# Patient Record
Sex: FEMALE | Race: BLACK OR AFRICAN AMERICAN | NOT HISPANIC OR LATINO | URBAN - METROPOLITAN AREA
[De-identification: names, ages, dates, MRNs, and addresses within clinical notes are randomized per-mention and may not be internally consistent; named-entity substitution may affect disease eponyms.]

---

## 2020-06-04 ENCOUNTER — OFFICE VISIT (OUTPATIENT)
Dept: URBAN - METROPOLITAN AREA CLINIC 113 | Facility: CLINIC | Age: 30
End: 2020-06-04

## 2020-06-23 ENCOUNTER — OFFICE VISIT (OUTPATIENT)
Dept: URBAN - METROPOLITAN AREA CLINIC 113 | Facility: CLINIC | Age: 30
End: 2020-06-23

## 2020-06-29 ENCOUNTER — OFFICE VISIT (OUTPATIENT)
Dept: URBAN - METROPOLITAN AREA CLINIC 13 | Facility: CLINIC | Age: 30
End: 2020-06-29

## 2020-07-23 ENCOUNTER — OFFICE VISIT (OUTPATIENT)
Dept: URBAN - METROPOLITAN AREA CLINIC 113 | Facility: CLINIC | Age: 30
End: 2020-07-23

## 2020-07-25 ENCOUNTER — TELEPHONE ENCOUNTER (OUTPATIENT)
Dept: URBAN - METROPOLITAN AREA CLINIC 13 | Facility: CLINIC | Age: 30
End: 2020-07-25

## 2020-07-25 RX ORDER — SODIUM PICOSULFATE, MAGNESIUM OXIDE, AND ANHYDROUS CITRIC ACID 10; 3.5; 12 MG/160ML; G/160ML; G/160ML
5PM DAY BEFORE PROCEDURE DRINK FIRST BOTTLE, 6 HOURS BEFORE PROCEDURE DRINK THE SECOND BOTTLE LIQUID ORAL
Qty: 1 | Refills: 0 | OUTPATIENT
Start: 2019-12-03 | End: 2020-01-14

## 2020-07-25 RX ORDER — CHROMIUM 200 MCG
TAKE 2 TABLET DAILY TABLET ORAL
Refills: 0 | OUTPATIENT
End: 2020-06-23

## 2020-07-25 RX ORDER — PREDNISONE 10 MG/1
TAKE 4 TABLETS DAILY TABLET ORAL
Qty: 120 | Refills: 1 | OUTPATIENT
Start: 2019-11-08 | End: 2019-11-27

## 2020-07-25 RX ORDER — USTEKINUMAB 90 MG/ML
EVERY TWO MONTHS INJECTION, SOLUTION SUBCUTANEOUS
Refills: 0 | OUTPATIENT
End: 2020-04-30

## 2020-07-25 RX ORDER — ONDANSETRON HYDROCHLORIDE 4 MG/1
EVERY 4-6 HOURS AS NEEDED FOR NAUSEA TABLET, FILM COATED ORAL
Qty: 30 | Refills: 1 | OUTPATIENT
Start: 2019-10-09 | End: 2019-11-27

## 2020-07-25 RX ORDER — PANTOPRAZOLE SODIUM 40 MG/1
TAKE 1 TABLET BY MOUTH EVERY DAY TABLET, DELAYED RELEASE ORAL
Qty: 30 | Refills: 6 | OUTPATIENT
Start: 2020-06-23 | End: 2020-07-16

## 2020-07-26 ENCOUNTER — TELEPHONE ENCOUNTER (OUTPATIENT)
Dept: URBAN - METROPOLITAN AREA CLINIC 13 | Facility: CLINIC | Age: 30
End: 2020-07-26

## 2020-07-26 RX ORDER — FLUCONAZOLE 150 MG/1
TABLET ORAL
Qty: 3 | Refills: 0 | Status: ACTIVE | COMMUNITY
Start: 2019-12-07

## 2020-07-26 RX ORDER — PANTOPRAZOLE SODIUM 40 MG/1
TAKE 1 TABLET BY MOUTH EVERY DAY TABLET, DELAYED RELEASE ORAL
Qty: 90 | Refills: 3 | Status: ACTIVE | COMMUNITY
Start: 2020-07-16

## 2020-07-26 RX ORDER — ONDANSETRON 8 MG/1
TAKE 1 TABLET EVERY 6-8 HOURS PRN TABLET, ORALLY DISINTEGRATING ORAL
Qty: 30 | Refills: 1 | Status: ACTIVE | COMMUNITY
Start: 2020-06-23

## 2020-07-26 RX ORDER — ERGOCALCIFEROL 1.25 MG/1
TAKE 1 CAPSULE BY MOUTH WEEKLY FOR 8 WEEKS CAPSULE ORAL
Qty: 8 | Refills: 0 | Status: ACTIVE | COMMUNITY
Start: 2020-02-23

## 2020-07-26 RX ORDER — FLUCONAZOLE 150 MG/1
TAKE 1 TABLET BY MOUTH REPEAT AFTER COMLETING ABX TABLET ORAL
Qty: 2 | Refills: 0 | Status: ACTIVE | COMMUNITY
Start: 2019-07-25

## 2020-07-26 RX ORDER — PREDNISONE 20 MG/1
TAKE 2 TABLET(S) EVERY DAY BY ORAL ROUTE FOR 7 DAYS TABLET ORAL
Qty: 14 | Refills: 0 | Status: ACTIVE | COMMUNITY
Start: 2018-09-26

## 2020-07-26 RX ORDER — PREDNISONE 10 MG/1
TAKE 3 TABLETS BY MOUTH EVERY DAY X3 DAYS-THEN 2 TABS DAILY X3DAYS-1 T TABLET ORAL
Qty: 18 | Refills: 0 | Status: ACTIVE | COMMUNITY
Start: 2018-10-03

## 2020-07-26 RX ORDER — USTEKINUMAB 90 MG/ML
INJECT 90MG EVERY FOUR WEEKS INJECTION, SOLUTION SUBCUTANEOUS
Qty: 1 | Refills: 11 | Status: ACTIVE | COMMUNITY
Start: 2020-01-22

## 2020-07-26 RX ORDER — CLINDAMYCIN HYDROCHLORIDE 300 MG/1
CAPSULE ORAL
Qty: 40 | Refills: 0 | Status: ACTIVE | COMMUNITY
Start: 2019-12-07

## 2020-09-12 ENCOUNTER — ERX REFILL RESPONSE (OUTPATIENT)
Age: 30
End: 2020-09-12

## 2020-09-12 RX ORDER — ERGOCALCIFEROL 1.25 MG/1
TAKE 1 CAPSULE BY MOUTH WEEKLY FOR 8 WEEKS CAPSULE, LIQUID FILLED ORAL
Qty: 8 | Refills: 0

## 2020-10-12 ENCOUNTER — TELEPHONE ENCOUNTER (OUTPATIENT)
Dept: URBAN - METROPOLITAN AREA CLINIC 113 | Facility: CLINIC | Age: 30
End: 2020-10-12

## 2020-10-12 RX ORDER — USTEKINUMAB 90 MG/ML
INJECT 90MG EVERY FOUR WEEKS INJECTION, SOLUTION SUBCUTANEOUS
Qty: 3 SYRINGE | Refills: 3

## 2020-11-07 ENCOUNTER — ERX REFILL RESPONSE (OUTPATIENT)
Age: 30
End: 2020-11-07

## 2020-11-07 RX ORDER — ERGOCALCIFEROL 1.25 MG/1
TAKE 1 CAPSULE BY MOUTH WEEKLY FOR 8 WEEKS CAPSULE, LIQUID FILLED ORAL
Qty: 8 | Refills: 0

## 2020-11-11 ENCOUNTER — ERX REFILL RESPONSE (OUTPATIENT)
Age: 30
End: 2020-11-11

## 2020-11-11 RX ORDER — ERGOCALCIFEROL 1.25 MG/1
TAKE 1 CAPSULE BY MOUTH WEEKLY FOR 8 WEEKS CAPSULE, LIQUID FILLED ORAL
Qty: 8 | Refills: 0

## 2021-01-13 ENCOUNTER — TELEPHONE ENCOUNTER (OUTPATIENT)
Dept: URBAN - METROPOLITAN AREA CLINIC 113 | Facility: CLINIC | Age: 31
End: 2021-01-13

## 2021-02-02 ENCOUNTER — OFFICE VISIT (OUTPATIENT)
Dept: URBAN - METROPOLITAN AREA CLINIC 113 | Facility: CLINIC | Age: 31
End: 2021-02-02

## 2021-03-10 ENCOUNTER — TELEPHONE ENCOUNTER (OUTPATIENT)
Dept: URBAN - METROPOLITAN AREA SURGERY CENTER 30 | Facility: SURGERY CENTER | Age: 31
End: 2021-03-10

## 2021-03-16 ENCOUNTER — TELEPHONE ENCOUNTER (OUTPATIENT)
Dept: URBAN - METROPOLITAN AREA CLINIC 113 | Facility: CLINIC | Age: 31
End: 2021-03-16

## 2021-04-27 ENCOUNTER — OFFICE VISIT (OUTPATIENT)
Dept: URBAN - METROPOLITAN AREA CLINIC 113 | Facility: CLINIC | Age: 31
End: 2021-04-27

## 2021-10-12 ENCOUNTER — OFFICE VISIT (OUTPATIENT)
Dept: URBAN - METROPOLITAN AREA CLINIC 107 | Facility: CLINIC | Age: 31
End: 2021-10-12
Payer: COMMERCIAL

## 2021-10-12 ENCOUNTER — WEB ENCOUNTER (OUTPATIENT)
Dept: URBAN - METROPOLITAN AREA CLINIC 107 | Facility: CLINIC | Age: 31
End: 2021-10-12

## 2021-10-12 VITALS
DIASTOLIC BLOOD PRESSURE: 90 MMHG | BODY MASS INDEX: 30.37 KG/M2 | SYSTOLIC BLOOD PRESSURE: 123 MMHG | HEIGHT: 66 IN | TEMPERATURE: 98.6 F | WEIGHT: 189 LBS | HEART RATE: 87 BPM

## 2021-10-12 DIAGNOSIS — K50.819 CROHN'S DISEASE OF SMALL AND LARGE INTESTINES WITH COMPLICATION: ICD-10-CM

## 2021-10-12 PROCEDURE — 99214 OFFICE O/P EST MOD 30 MIN: CPT | Performed by: INTERNAL MEDICINE

## 2021-10-12 RX ORDER — PREDNISONE 10 MG/1
TAKE 3 TABLETS BY MOUTH EVERY DAY X3 DAYS-THEN 2 TABS DAILY X3DAYS-1 T TABLET ORAL
Qty: 18 | Refills: 0 | Status: ON HOLD | COMMUNITY
Start: 2018-10-03

## 2021-10-12 RX ORDER — PREDNISONE 20 MG/1
TAKE 2 TABLET(S) EVERY DAY BY ORAL ROUTE FOR 7 DAYS TABLET ORAL
Qty: 14 | Refills: 0 | Status: ON HOLD | COMMUNITY
Start: 2018-09-26

## 2021-10-12 RX ORDER — USTEKINUMAB 90 MG/ML
INJECT 90MG EVERY FOUR WEEKS INJECTION, SOLUTION SUBCUTANEOUS
Qty: 3 SYRINGE | Refills: 3 | Status: ON HOLD | COMMUNITY

## 2021-10-12 RX ORDER — PANTOPRAZOLE SODIUM 40 MG/1
TAKE 1 TABLET BY MOUTH EVERY DAY TABLET, DELAYED RELEASE ORAL
Qty: 90 | Refills: 3 | Status: ON HOLD | COMMUNITY
Start: 2020-07-16

## 2021-10-12 RX ORDER — CLINDAMYCIN HYDROCHLORIDE 300 MG/1
CAPSULE ORAL
Qty: 40 | Refills: 0 | Status: ON HOLD | COMMUNITY
Start: 2019-12-07

## 2021-10-12 RX ORDER — FLUCONAZOLE 150 MG/1
TAKE 1 TABLET BY MOUTH REPEAT AFTER COMLETING ABX TABLET ORAL
Qty: 2 | Refills: 0 | Status: ON HOLD | COMMUNITY
Start: 2019-07-25

## 2021-10-12 RX ORDER — ONDANSETRON 8 MG/1
TAKE 1 TABLET EVERY 6-8 HOURS PRN TABLET, ORALLY DISINTEGRATING ORAL
Qty: 30 | Refills: 1 | Status: ON HOLD | COMMUNITY
Start: 2020-06-23

## 2021-10-12 RX ORDER — ERGOCALCIFEROL 1.25 MG/1
TAKE 1 CAPSULE BY MOUTH WEEKLY FOR 8 WEEKS CAPSULE, LIQUID FILLED ORAL
Qty: 8 | Refills: 0 | Status: ON HOLD | COMMUNITY

## 2021-10-12 NOTE — HPI-TODAY'S VISIT:
Ms. Elizabeth is a 31-year-old woman with a past medical history significant for ileocolonic Crohn's disease status post ileocolonic resection status post presenting for follow-up after an extended absence.  She was last seen in the office on 6/23/2020 for follow-up regarding Crohn's disease status post ileocecal resection on Stelara 90 mg every 5 weeks. Her previous evaluation included ulceration in the distal rectum with stricture. She underwent a loop ileostomy by Dr. Pantoja in July 2020.   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  She has discontinued her use of Stelara. She reports her ostomy output has been normal without any blood or mucus. She occasionally has some stool and mucus per rectum along with flatus. She has had no issues of perianal fistulas or perianal pain. She is interested in loop ileostomy reversal. Her last colonoscopy on 12/5/19 was notable for distal rectal ulcerated stricture (no dysplasia reported), colitis, and a patent ileocolonic anastomosis c/w active Crohn's colitis. She has not clinically benefitted from increased Stelara dosing. Her previous treatment with anti-TNFa was ineffective and immunomodulators were not tolerated (treated elsewhere).

## 2021-10-12 NOTE — HPI-OTHER HISTORIES
MRI of the brain that was unremarkable.   EGD 12/5/19: Z line irregular with distal esophageal hyperemia consistent with GERD. Mild gastric erythema, biopsied. Normal duodenum. Gastric antral biopsies revealed mild chronic gastritis with antral and oxyntic mucosa negative for H. pylori. Colonoscopy 12/5/19: Adequate bowel preparation. Perianal skin tags on perianal exam. Stricture in the distal rectum status post dilation with advancement of the scope, biopsied. Multiple ulcers in the rectum, in the sigmoid colon, in the descending colon, in the transverse colon and at the colonic anastomosis, biopsied. Patent end-to-side ileocolonic anastomosis, characterized by healthy-appearing mucosa. Unable to intubate neoterminal ileum. Rectal stricture biopsy revealed chronic active colitis, squamous mucosa with acute and chronic inflammation. Random colon biopsies revealed chronic active colitis. Colonoscopy (8/9/18, Humira every 2 weeks): Few benign ulcers in the distal ileum status post biopsy, if you ulcers at the colon anastomosis in the transverse colon, moderate Crohn's colitis of the sigmoid and rectum. Terminal ileal biopsies show mildly active chronic ileitis, ileocolonic anastomosis shows severely active chronic enteritis, biopsies of the sigmoid colon and rectum showed severely active chronic colitis. None of the specimens had any changes of dysplasia or granulomas. Upper GI series with small bowel follow through (8/22/17): rapid small bowel transit time, status post right hemicolectomy

## 2021-10-16 LAB
A/G RATIO: 1.6
ALBUMIN: 4.6
ALKALINE PHOSPHATASE: 68
ALT (SGPT): 14
AST (SGOT): 16
BASO (ABSOLUTE): 0
BASOS: 0
BILIRUBIN, TOTAL: <0.2
BUN/CREATININE RATIO: 22
BUN: 15
C-REACTIVE PROTEIN, QUANT: <1
CALCIUM: 9.9
CARBON DIOXIDE, TOTAL: 26
CHLORIDE: 103
CREATININE: 0.68
EGFR IF AFRICN AM: 137
EGFR IF NONAFRICN AM: 119
EOS (ABSOLUTE): 0
EOS: 0
GLOBULIN, TOTAL: 2.9
GLUCOSE: 93
HEMATOCRIT: 39.9
HEMATOLOGY COMMENTS:: (no result)
HEMOGLOBIN: 13.2
HEP B SURFACE AB, QUAL: NON REACTIVE
HEPATITIS B SURF AB QUANT: 5
IMMATURE CELLS: (no result)
IMMATURE GRANS (ABS): 0
IMMATURE GRANULOCYTES: 0
LYMPHS (ABSOLUTE): 1.4
LYMPHS: 16
MCH: 32.3
MCHC: 33.1
MCV: 98
MONOCYTES(ABSOLUTE): 0.6
MONOCYTES: 7
NEUTROPHILS (ABSOLUTE): 6.7
NEUTROPHILS: 77
NRBC: (no result)
PLATELETS: 212
POTASSIUM: 4.1
PROTEIN, TOTAL: 7.5
QUANTIFERON CRITERIA: (no result)
QUANTIFERON INCUBATION: (no result)
QUANTIFERON MITOGEN VALUE: 2.51
QUANTIFERON NIL VALUE: 0.06
QUANTIFERON TB1 AG VALUE: 0.13
QUANTIFERON TB2 AG VALUE: 0.08
QUANTIFERON-TB GOLD PLUS: NEGATIVE
RBC: 4.09
RDW: 12.1
SODIUM: 142
VITAMIN B12: 564
VITAMIN D, 25-HYDROXY: 21.5
WBC: 8.8

## 2021-10-18 ENCOUNTER — TELEPHONE ENCOUNTER (OUTPATIENT)
Dept: URBAN - METROPOLITAN AREA CLINIC 113 | Facility: CLINIC | Age: 31
End: 2021-10-18

## 2021-10-18 RX ORDER — FLUCONAZOLE 150 MG/1
TAKE 1 TABLET BY MOUTH REPEAT AFTER COMLETING ABX TABLET ORAL
Qty: 2 | Refills: 0 | Status: ON HOLD | COMMUNITY
Start: 2019-07-25

## 2021-10-18 RX ORDER — USTEKINUMAB 90 MG/ML
INJECT 90MG EVERY FOUR WEEKS INJECTION, SOLUTION SUBCUTANEOUS
Qty: 3 SYRINGE | Refills: 3 | Status: ON HOLD | COMMUNITY

## 2021-10-18 RX ORDER — ERGOCALCIFEROL 1.25 MG/1
TAKE 1 CAPSULE BY MOUTH WEEKLY FOR 8 WEEKS CAPSULE, LIQUID FILLED ORAL
Qty: 8 | Refills: 0 | Status: ON HOLD | COMMUNITY

## 2021-10-18 RX ORDER — ONDANSETRON 8 MG/1
TAKE 1 TABLET EVERY 6-8 HOURS PRN TABLET, ORALLY DISINTEGRATING ORAL
Qty: 30 | Refills: 1 | Status: ON HOLD | COMMUNITY
Start: 2020-06-23

## 2021-10-18 RX ORDER — PANTOPRAZOLE SODIUM 40 MG/1
TAKE 1 TABLET BY MOUTH EVERY DAY TABLET, DELAYED RELEASE ORAL
Qty: 90 | Refills: 3 | Status: ON HOLD | COMMUNITY
Start: 2020-07-16

## 2021-10-18 RX ORDER — PREDNISONE 10 MG/1
TAKE 3 TABLETS BY MOUTH EVERY DAY X3 DAYS-THEN 2 TABS DAILY X3DAYS-1 T TABLET ORAL
Qty: 18 | Refills: 0 | Status: ON HOLD | COMMUNITY
Start: 2018-10-03

## 2021-10-18 RX ORDER — CLINDAMYCIN HYDROCHLORIDE 300 MG/1
CAPSULE ORAL
Qty: 40 | Refills: 0 | Status: ON HOLD | COMMUNITY
Start: 2019-12-07

## 2021-10-18 RX ORDER — PREDNISONE 20 MG/1
TAKE 2 TABLET(S) EVERY DAY BY ORAL ROUTE FOR 7 DAYS TABLET ORAL
Qty: 14 | Refills: 0 | Status: ON HOLD | COMMUNITY
Start: 2018-09-26

## 2021-10-18 RX ORDER — ERGOCALCIFEROL 1.25 MG/1
1 CAPSULE CAPSULE, LIQUID FILLED ORAL
Qty: 8 | Refills: 0 | OUTPATIENT
Start: 2021-10-18 | End: 2021-12-12

## 2021-10-21 ENCOUNTER — TELEPHONE ENCOUNTER (OUTPATIENT)
Dept: URBAN - METROPOLITAN AREA CLINIC 113 | Facility: CLINIC | Age: 31
End: 2021-10-21

## 2021-10-25 ENCOUNTER — LAB OUTSIDE AN ENCOUNTER (OUTPATIENT)
Dept: URBAN - METROPOLITAN AREA CLINIC 107 | Facility: CLINIC | Age: 31
End: 2021-10-25

## 2021-11-04 ENCOUNTER — OFFICE VISIT (OUTPATIENT)
Dept: URBAN - METROPOLITAN AREA SURGERY CENTER 25 | Facility: SURGERY CENTER | Age: 31
End: 2021-11-04
Payer: COMMERCIAL

## 2021-11-04 ENCOUNTER — CLAIMS CREATED FROM THE CLAIM WINDOW (OUTPATIENT)
Dept: URBAN - METROPOLITAN AREA CLINIC 4 | Facility: CLINIC | Age: 31
End: 2021-11-04
Payer: COMMERCIAL

## 2021-11-04 DIAGNOSIS — K50.00 CROHN'S DISEASE OF SMALL INTESTINE WITHOUT COMPLICATIONS: ICD-10-CM

## 2021-11-04 DIAGNOSIS — K50.80 CROHN'S DISEASE OF BOTH SMALL AND LARGE INTESTINE: ICD-10-CM

## 2021-11-04 DIAGNOSIS — K50.119 CROHN'S DISEASE OF LARGE INTESTINE WITH UNSPECIFIED COMPLICATIONS: ICD-10-CM

## 2021-11-04 PROCEDURE — 88305 TISSUE EXAM BY PATHOLOGIST: CPT | Performed by: PATHOLOGY

## 2021-11-04 PROCEDURE — 45380 COLONOSCOPY AND BIOPSY: CPT | Performed by: INTERNAL MEDICINE

## 2021-11-04 PROCEDURE — G8907 PT DOC NO EVENTS ON DISCHARG: HCPCS | Performed by: INTERNAL MEDICINE

## 2021-11-04 RX ORDER — ONDANSETRON 8 MG/1
TAKE 1 TABLET EVERY 6-8 HOURS PRN TABLET, ORALLY DISINTEGRATING ORAL
Qty: 30 | Refills: 1 | Status: ON HOLD | COMMUNITY
Start: 2020-06-23

## 2021-11-04 RX ORDER — ERGOCALCIFEROL 1.25 MG/1
TAKE 1 CAPSULE BY MOUTH WEEKLY FOR 8 WEEKS CAPSULE, LIQUID FILLED ORAL
Qty: 8 | Refills: 0 | Status: ON HOLD | COMMUNITY

## 2021-11-04 RX ORDER — PANTOPRAZOLE SODIUM 40 MG/1
TAKE 1 TABLET BY MOUTH EVERY DAY TABLET, DELAYED RELEASE ORAL
Qty: 90 | Refills: 3 | Status: ON HOLD | COMMUNITY
Start: 2020-07-16

## 2021-11-04 RX ORDER — CLINDAMYCIN HYDROCHLORIDE 300 MG/1
CAPSULE ORAL
Qty: 40 | Refills: 0 | Status: ON HOLD | COMMUNITY
Start: 2019-12-07

## 2021-11-04 RX ORDER — PREDNISONE 10 MG/1
TAKE 3 TABLETS BY MOUTH EVERY DAY X3 DAYS-THEN 2 TABS DAILY X3DAYS-1 T TABLET ORAL
Qty: 18 | Refills: 0 | Status: ON HOLD | COMMUNITY
Start: 2018-10-03

## 2021-11-04 RX ORDER — PREDNISONE 20 MG/1
TAKE 2 TABLET(S) EVERY DAY BY ORAL ROUTE FOR 7 DAYS TABLET ORAL
Qty: 14 | Refills: 0 | Status: ON HOLD | COMMUNITY
Start: 2018-09-26

## 2021-11-04 RX ORDER — ERGOCALCIFEROL 1.25 MG/1
1 CAPSULE CAPSULE, LIQUID FILLED ORAL
Qty: 8 | Refills: 0 | Status: ACTIVE | COMMUNITY
Start: 2021-10-18 | End: 2021-12-12

## 2021-11-04 RX ORDER — FLUCONAZOLE 150 MG/1
TAKE 1 TABLET BY MOUTH REPEAT AFTER COMLETING ABX TABLET ORAL
Qty: 2 | Refills: 0 | Status: ON HOLD | COMMUNITY
Start: 2019-07-25

## 2021-11-04 RX ORDER — USTEKINUMAB 90 MG/ML
INJECT 90MG EVERY FOUR WEEKS INJECTION, SOLUTION SUBCUTANEOUS
Qty: 3 SYRINGE | Refills: 3 | Status: ON HOLD | COMMUNITY

## 2021-11-09 ENCOUNTER — ERX REFILL RESPONSE (OUTPATIENT)
Dept: URBAN - METROPOLITAN AREA CLINIC 113 | Facility: CLINIC | Age: 31
End: 2021-11-09

## 2021-11-09 RX ORDER — ERGOCALCIFEROL 1.25 MG/1
TAKE 1 CAPSULE BY MOUTH ONCE A WEEK FOR 56 DAYS CAPSULE, LIQUID FILLED ORAL
Qty: 4 CAPSULE | Refills: 2 | OUTPATIENT

## 2021-11-09 RX ORDER — ERGOCALCIFEROL 1.25 MG/1
1 CAPSULE CAPSULE, LIQUID FILLED ORAL
Qty: 8 | Refills: 0 | OUTPATIENT

## 2021-11-30 ENCOUNTER — ERX REFILL RESPONSE (OUTPATIENT)
Dept: URBAN - METROPOLITAN AREA CLINIC 113 | Facility: CLINIC | Age: 31
End: 2021-11-30

## 2021-11-30 RX ORDER — ERGOCALCIFEROL 1.25 MG/1
TAKE 1 CAPSULE BY MOUTH ONCE WEEKLY CAPSULE, LIQUID FILLED ORAL
Qty: 4 CAPSULE | Refills: 2 | OUTPATIENT

## 2021-11-30 RX ORDER — ERGOCALCIFEROL 1.25 MG/1
TAKE 1 CAPSULE BY MOUTH ONCE A WEEK FOR 56 DAYS CAPSULE, LIQUID FILLED ORAL
Qty: 4 CAPSULE | Refills: 2 | OUTPATIENT

## 2021-12-20 ENCOUNTER — OFFICE VISIT (OUTPATIENT)
Dept: URBAN - METROPOLITAN AREA CLINIC 113 | Facility: CLINIC | Age: 31
End: 2021-12-20

## 2021-12-20 ENCOUNTER — ERX REFILL RESPONSE (OUTPATIENT)
Dept: URBAN - METROPOLITAN AREA CLINIC 113 | Facility: CLINIC | Age: 31
End: 2021-12-20

## 2021-12-20 RX ORDER — ERGOCALCIFEROL 1.25 MG/1
TAKE 1 CAPSULE BY MOUTH ONCE WEEKLY CAPSULE, LIQUID FILLED ORAL
Qty: 4 CAPSULE | Refills: 2 | OUTPATIENT

## 2021-12-29 ENCOUNTER — TELEPHONE ENCOUNTER (OUTPATIENT)
Dept: URBAN - METROPOLITAN AREA CLINIC 113 | Facility: CLINIC | Age: 31
End: 2021-12-29

## 2022-01-11 ENCOUNTER — OFFICE VISIT (OUTPATIENT)
Dept: URBAN - METROPOLITAN AREA CLINIC 113 | Facility: CLINIC | Age: 32
End: 2022-01-11
Payer: COMMERCIAL

## 2022-01-11 ENCOUNTER — ERX REFILL RESPONSE (OUTPATIENT)
Dept: URBAN - METROPOLITAN AREA CLINIC 113 | Facility: CLINIC | Age: 32
End: 2022-01-11

## 2022-01-11 VITALS — WEIGHT: 198 LBS | TEMPERATURE: 98.1 F | BODY MASS INDEX: 31.82 KG/M2 | RESPIRATION RATE: 18 BRPM | HEIGHT: 66 IN

## 2022-01-11 DIAGNOSIS — K50.80 CROHN'S DISEASE OF BOTH SMALL AND LARGE INTESTINE WITHOUT COMPLICATION: ICD-10-CM

## 2022-01-11 PROCEDURE — 99213 OFFICE O/P EST LOW 20 MIN: CPT | Performed by: INTERNAL MEDICINE

## 2022-01-11 RX ORDER — PREDNISONE 20 MG/1
TAKE 2 TABLET(S) EVERY DAY BY ORAL ROUTE FOR 7 DAYS TABLET ORAL
Qty: 14 | Refills: 0 | Status: DISCONTINUED | COMMUNITY
Start: 2018-09-26

## 2022-01-11 RX ORDER — CLINDAMYCIN HYDROCHLORIDE 300 MG/1
CAPSULE ORAL
Qty: 40 | Refills: 0 | Status: DISCONTINUED | COMMUNITY
Start: 2019-12-07

## 2022-01-11 RX ORDER — VEDOLIZUMAB 300 MG/5ML
AS DIRECTED INJECTION, POWDER, LYOPHILIZED, FOR SOLUTION INTRAVENOUS
Qty: 300 MILLIGRAMS | Refills: 0 | OUTPATIENT
Start: 2022-01-26 | End: 2022-04-26

## 2022-01-11 RX ORDER — ERGOCALCIFEROL 1.25 MG/1
TAKE 1 CAPSULE BY MOUTH ONCE WEEKLY CAPSULE, LIQUID FILLED ORAL
Qty: 4 CAPSULE | Refills: 2 | OUTPATIENT

## 2022-01-11 RX ORDER — USTEKINUMAB 90 MG/ML
INJECT 90MG EVERY FOUR WEEKS INJECTION, SOLUTION SUBCUTANEOUS
Qty: 3 SYRINGE | Refills: 3 | Status: DISCONTINUED | COMMUNITY

## 2022-01-11 RX ORDER — FLUCONAZOLE 150 MG/1
TAKE 1 TABLET BY MOUTH REPEAT AFTER COMLETING ABX TABLET ORAL
Qty: 2 | Refills: 0 | Status: DISCONTINUED | COMMUNITY
Start: 2019-07-25

## 2022-01-11 RX ORDER — PREDNISONE 10 MG/1
TAKE 3 TABLETS BY MOUTH EVERY DAY X3 DAYS-THEN 2 TABS DAILY X3DAYS-1 T TABLET ORAL
Qty: 18 | Refills: 0 | Status: DISCONTINUED | COMMUNITY
Start: 2018-10-03

## 2022-01-11 RX ORDER — ONDANSETRON 8 MG/1
TAKE 1 TABLET EVERY 6-8 HOURS PRN TABLET, ORALLY DISINTEGRATING ORAL
Qty: 30 | Refills: 1 | Status: DISCONTINUED | COMMUNITY
Start: 2020-06-23

## 2022-01-11 RX ORDER — PANTOPRAZOLE SODIUM 40 MG/1
TAKE 1 TABLET BY MOUTH EVERY DAY TABLET, DELAYED RELEASE ORAL
Qty: 90 | Refills: 3 | Status: DISCONTINUED | COMMUNITY
Start: 2020-07-16

## 2022-01-11 RX ORDER — ERGOCALCIFEROL 1.25 MG/1
TAKE 1 CAPSULE BY MOUTH ONCE WEEKLY 28 CAPSULE, LIQUID FILLED ORAL
Qty: 4 CAPSULE | Refills: 2 | OUTPATIENT

## 2022-01-11 RX ORDER — ERGOCALCIFEROL 1.25 MG/1
TAKE 1 CAPSULE BY MOUTH ONCE WEEKLY CAPSULE, LIQUID FILLED ORAL
Qty: 4 CAPSULE | Refills: 2 | Status: ACTIVE | COMMUNITY

## 2022-01-11 NOTE — HPI-TODAY'S VISIT:
Ms. Elizabeth is a 31-year-old woman with a past medical history significant for ileocolonic Crohn's disease status post ileocolonic resection not on medication presenting for follow up after loop ileostomy takedown and colonoscopy. She was last seen on 10/12/2021 to reestablish care regarding her ileocolonic Crohn's disease status post ileocolectomy followed by loop ileostomy.  She had previously been treated with anti-TNF Biologics and most recently Stelara.  At that visit she was on no medication at her request.  Her plan was to follow-up with Dr. Pantoja regarding takedown of her loop ileostomy. A colonoscopy was performed on 11/4/2021 to reassess her Crohn's disease activity.  Limited evaluation of the small bowel via the loop ileostomy demonstrated normal mucosa.  She had perianal tags but no obvious active perianal Crohn's disease, mild to moderate colon erythema status post biopsy and mild neoterminal ileal erythema status post biopsy, patent ileocolonic anastomosis (45 cm from anal verge).  Biopsies showed chronic active ileitis with aphthous ulcer in rare granulomas consistent with Crohn's disease.  Colon biopsies showed chronic active colitis consistent with Crohn's disease. She underwent takedown of her loop ileostomy for restoration of intestinal continuity by Dr. Pantoja on 12/20/2021. She reports since surgery that her bowel movements are "normal".  She denies any blood per rectum, mucus in her stool and no abdominal pain.  She is sleeping well.  She denies any heartburn, reflux or difficulty swallowing.  She is not taking Imodium or NSAIDs.  She reports having issues with hidradenitis suppurativa before, but this is also improved.  She is very reluctant to resume biologic therapy.

## 2022-01-12 ENCOUNTER — OFFICE VISIT (OUTPATIENT)
Dept: URBAN - METROPOLITAN AREA CLINIC 113 | Facility: CLINIC | Age: 32
End: 2022-01-12

## 2022-01-26 ENCOUNTER — TELEPHONE ENCOUNTER (OUTPATIENT)
Dept: URBAN - METROPOLITAN AREA CLINIC 113 | Facility: CLINIC | Age: 32
End: 2022-01-26

## 2022-01-26 PROBLEM — 50440006: Status: ACTIVE | Noted: 2022-01-11

## 2022-01-26 RX ORDER — VEDOLIZUMAB 300 MG/5ML
AS DIRECTED INJECTION, POWDER, LYOPHILIZED, FOR SOLUTION INTRAVENOUS
Qty: 300 MILLIGRAMS | Refills: 0 | OUTPATIENT
Start: 2022-02-06 | End: 2022-05-07

## 2022-02-14 ENCOUNTER — ERX REFILL RESPONSE (OUTPATIENT)
Dept: URBAN - METROPOLITAN AREA CLINIC 113 | Facility: CLINIC | Age: 32
End: 2022-02-14

## 2022-02-14 RX ORDER — ERGOCALCIFEROL 1.25 MG/1
TAKE 1 CAPSULE BY MOUTH ONCE WEEKLY CAPSULE, LIQUID FILLED ORAL
Qty: 4 CAPSULE | Refills: 2 | OUTPATIENT

## 2022-02-14 RX ORDER — ERGOCALCIFEROL 1.25 MG/1
TAKE 1 CAPSULE BY MOUTH ONCE WEEKLY 28 CAPSULE, LIQUID FILLED ORAL
Qty: 4 CAPSULE | Refills: 2 | OUTPATIENT

## 2022-03-03 ENCOUNTER — TELEPHONE ENCOUNTER (OUTPATIENT)
Dept: URBAN - METROPOLITAN AREA CLINIC 113 | Facility: CLINIC | Age: 32
End: 2022-03-03

## 2022-03-10 ENCOUNTER — ERX REFILL RESPONSE (OUTPATIENT)
Dept: URBAN - METROPOLITAN AREA CLINIC 113 | Facility: CLINIC | Age: 32
End: 2022-03-10

## 2022-03-10 RX ORDER — ERGOCALCIFEROL 1.25 MG/1
TAKE 1 CAPSULE BY MOUTH ONCE WEEKLY CAPSULE, LIQUID FILLED ORAL
Qty: 4 CAPSULE | Refills: 2 | OUTPATIENT

## 2022-04-02 ENCOUNTER — ERX REFILL RESPONSE (OUTPATIENT)
Dept: URBAN - METROPOLITAN AREA CLINIC 113 | Facility: CLINIC | Age: 32
End: 2022-04-02

## 2022-04-02 RX ORDER — ERGOCALCIFEROL 1.25 MG/1
TAKE 1 CAPSULE BY MOUTH ONCE WEEKLY CAPSULE, LIQUID FILLED ORAL
Qty: 4 CAPSULE | Refills: 2 | OUTPATIENT

## 2022-04-11 ENCOUNTER — OFFICE VISIT (OUTPATIENT)
Dept: URBAN - METROPOLITAN AREA CLINIC 113 | Facility: CLINIC | Age: 32
End: 2022-04-11
Payer: COMMERCIAL

## 2022-04-11 ENCOUNTER — OFFICE VISIT (OUTPATIENT)
Dept: URBAN - METROPOLITAN AREA CLINIC 113 | Facility: CLINIC | Age: 32
End: 2022-04-11

## 2022-04-11 VITALS
HEART RATE: 68 BPM | WEIGHT: 199 LBS | DIASTOLIC BLOOD PRESSURE: 88 MMHG | HEIGHT: 66 IN | BODY MASS INDEX: 31.98 KG/M2 | TEMPERATURE: 97.8 F | SYSTOLIC BLOOD PRESSURE: 122 MMHG

## 2022-04-11 DIAGNOSIS — E55.9 VITAMIN D DEFICIENCY: ICD-10-CM

## 2022-04-11 DIAGNOSIS — K50.80 CROHN'S DISEASE OF BOTH SMALL AND LARGE INTESTINE WITHOUT COMPLICATION: ICD-10-CM

## 2022-04-11 PROCEDURE — 99213 OFFICE O/P EST LOW 20 MIN: CPT | Performed by: INTERNAL MEDICINE

## 2022-04-11 RX ORDER — VEDOLIZUMAB 300 MG/5ML
AS DIRECTED INJECTION, POWDER, LYOPHILIZED, FOR SOLUTION INTRAVENOUS
Qty: 300 MILLIGRAMS | Refills: 0 | COMMUNITY
Start: 2022-01-26 | End: 2022-04-26

## 2022-04-11 RX ORDER — VEDOLIZUMAB 300 MG/5ML
AS DIRECTED INJECTION, POWDER, LYOPHILIZED, FOR SOLUTION INTRAVENOUS
Qty: 300 MILLIGRAMS | Refills: 0 | OUTPATIENT
Start: 2022-04-18 | End: 2022-07-17

## 2022-04-11 RX ORDER — ERGOCALCIFEROL 1.25 MG/1
TAKE 1 CAPSULE BY MOUTH ONCE WEEKLY CAPSULE, LIQUID FILLED ORAL
OUTPATIENT

## 2022-04-11 RX ORDER — ERGOCALCIFEROL 1.25 MG/1
TAKE 1 CAPSULE BY MOUTH ONCE WEEKLY CAPSULE, LIQUID FILLED ORAL
Qty: 4 CAPSULE | Refills: 2 | Status: ACTIVE | COMMUNITY

## 2022-04-11 RX ORDER — VEDOLIZUMAB 300 MG/5ML
AS DIRECTED INJECTION, POWDER, LYOPHILIZED, FOR SOLUTION INTRAVENOUS
Qty: 300 MILLIGRAMS | Refills: 0 | COMMUNITY
Start: 2022-02-06 | End: 2022-05-07

## 2022-04-11 NOTE — HPI-TODAY'S VISIT:
Ms. Elizabeth is a 31-year-old woman with a past medical history significant for ileocolonic Crohn's disease status post ileocolonic resection and diverting loop colostomy s/p takedown (12/21) not on medication for Crohn's disease presenting for follow up.  She was last seen on 1/11/2022 regarding her Crohn's disease.  At that time she reported that she was doing well off of medication.  Previous treatment with anti-TNF agents were ineffective and/or poorly tolerated and she did not feel that ustekinumab was helpful.  We discussed vedolizumab treatment, but final decision depended on her establishing medical insurance.  She returns for follow-up reporting that she continues to do well.  She had 1 episode of diarrhea with some red blood per rectum that occurred for 1 day after she "drank too much alcohol".  She otherwise has been having fairly regular and normal consistency bowel movements without mucus or blood per rectum.  No perianal symptoms, abdominal pain, nausea, vomiting.  She does not take NSAIDs and does not use any tobacco products.  She is interested in treatment with vedolizumab.  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

## 2022-04-12 ENCOUNTER — TELEPHONE ENCOUNTER (OUTPATIENT)
Dept: URBAN - METROPOLITAN AREA CLINIC 113 | Facility: CLINIC | Age: 32
End: 2022-04-12

## 2022-04-18 ENCOUNTER — TELEPHONE ENCOUNTER (OUTPATIENT)
Dept: URBAN - METROPOLITAN AREA CLINIC 113 | Facility: CLINIC | Age: 32
End: 2022-04-18

## 2022-04-28 ENCOUNTER — ERX REFILL RESPONSE (OUTPATIENT)
Dept: URBAN - METROPOLITAN AREA CLINIC 113 | Facility: CLINIC | Age: 32
End: 2022-04-28

## 2022-04-28 RX ORDER — ERGOCALCIFEROL 1.25 MG/1
TAKE 1 CAPSULE BY MOUTH ONCE WEEKLY CAPSULE, LIQUID FILLED ORAL
Qty: 4 CAPSULE | Refills: 2 | OUTPATIENT

## 2022-04-28 RX ORDER — ERGOCALCIFEROL 1.25 MG/1
TAKE 1 CAPSULE BY MOUTH ONCE WEEKLY 28 CAPSULE, LIQUID FILLED ORAL
Qty: 4 CAPSULE | Refills: 2 | OUTPATIENT

## 2022-05-11 ENCOUNTER — TELEPHONE ENCOUNTER (OUTPATIENT)
Dept: URBAN - METROPOLITAN AREA CLINIC 113 | Facility: CLINIC | Age: 32
End: 2022-05-11

## 2022-05-18 ENCOUNTER — TELEPHONE ENCOUNTER (OUTPATIENT)
Dept: URBAN - METROPOLITAN AREA CLINIC 113 | Facility: CLINIC | Age: 32
End: 2022-05-18

## 2022-06-02 ENCOUNTER — TELEPHONE ENCOUNTER (OUTPATIENT)
Dept: URBAN - METROPOLITAN AREA CLINIC 113 | Facility: CLINIC | Age: 32
End: 2022-06-02

## 2022-06-10 ENCOUNTER — TELEPHONE ENCOUNTER (OUTPATIENT)
Dept: URBAN - METROPOLITAN AREA CLINIC 113 | Facility: CLINIC | Age: 32
End: 2022-06-10

## 2022-06-17 ENCOUNTER — TELEPHONE ENCOUNTER (OUTPATIENT)
Dept: URBAN - METROPOLITAN AREA CLINIC 113 | Facility: CLINIC | Age: 32
End: 2022-06-17

## 2022-06-17 RX ORDER — ERGOCALCIFEROL 1.25 MG/1
1 CAPSULE CAPSULE ORAL
Qty: 12 | Refills: 0 | OUTPATIENT
Start: 2022-06-20 | End: 2022-09-18

## 2022-06-17 RX ORDER — VEDOLIZUMAB 300 MG/5ML
AS DIRECTED INJECTION, POWDER, LYOPHILIZED, FOR SOLUTION INTRAVENOUS
Qty: 300 MILLIGRAMS | Refills: 0 | Status: ACTIVE | COMMUNITY
Start: 2022-04-18 | End: 2022-07-17

## 2022-06-17 RX ORDER — ERGOCALCIFEROL 1.25 MG/1
TAKE 1 CAPSULE BY MOUTH ONCE WEEKLY 28 CAPSULE, LIQUID FILLED ORAL
Qty: 4 CAPSULE | Refills: 2 | Status: ACTIVE | COMMUNITY

## 2022-06-17 RX ORDER — PREDNISONE 20 MG/1
2 TABLETS TABLET ORAL ONCE A DAY
Qty: 60 TABLET | Refills: 1 | OUTPATIENT
Start: 2022-06-20 | End: 2022-08-19

## 2022-06-20 PROBLEM — 71833008: Status: ACTIVE | Noted: 2021-10-12

## 2022-08-15 ENCOUNTER — ERX REFILL RESPONSE (OUTPATIENT)
Dept: URBAN - METROPOLITAN AREA CLINIC 113 | Facility: CLINIC | Age: 32
End: 2022-08-15

## 2022-08-15 RX ORDER — PREDNISONE 20 MG/1
2 TABLETS TABLET ORAL ONCE A DAY
Qty: 60 TABLET | Refills: 1 | OUTPATIENT

## 2022-08-15 RX ORDER — PREDNISONE 20 MG/1
TAKE 2 TABLETS BY MOUTH EVERY DAY FOR 30 DAYS TABLET ORAL
Qty: 60 TABLET | Refills: 1 | OUTPATIENT

## 2022-08-23 ENCOUNTER — TELEPHONE ENCOUNTER (OUTPATIENT)
Dept: URBAN - METROPOLITAN AREA CLINIC 113 | Facility: CLINIC | Age: 32
End: 2022-08-23

## 2022-09-08 ENCOUNTER — ERX REFILL RESPONSE (OUTPATIENT)
Dept: URBAN - METROPOLITAN AREA CLINIC 113 | Facility: CLINIC | Age: 32
End: 2022-09-08

## 2022-09-08 RX ORDER — ERGOCALCIFEROL 1.25 MG/1
1 CAPSULE CAPSULE ORAL
Qty: 12 | Refills: 0 | OUTPATIENT

## 2022-09-08 RX ORDER — ERGOCALCIFEROL 1.25 MG/1
TAKE 1 CAPSULE BY MOUTH ONCE A WEEK FOR 90 DAYS CAPSULE, LIQUID FILLED ORAL
Qty: 12 CAPSULE | Refills: 0 | OUTPATIENT

## 2022-10-29 ENCOUNTER — ERX REFILL RESPONSE (OUTPATIENT)
Dept: URBAN - METROPOLITAN AREA CLINIC 113 | Facility: CLINIC | Age: 32
End: 2022-10-29

## 2022-10-29 RX ORDER — PREDNISONE 20 MG/1
TAKE 2 TABLETS BY MOUTH EVERY DAY FOR 30 DAYS TABLET ORAL
Qty: 60 TABLET | Refills: 1 | OUTPATIENT

## 2022-10-29 RX ORDER — PREDNISONE 20 MG/1
TAKE 2 TABLETS BY MOUTH EVERY DAY TABLET ORAL
Qty: 60 TABLET | Refills: 1 | OUTPATIENT

## 2022-11-19 ENCOUNTER — ERX REFILL RESPONSE (OUTPATIENT)
Dept: URBAN - METROPOLITAN AREA CLINIC 113 | Facility: CLINIC | Age: 32
End: 2022-11-19

## 2022-11-19 RX ORDER — ERGOCALCIFEROL 1.25 MG/1
TAKE 1 CAPSULE BY MOUTH ONCE A WEEK FOR 90 DAYS CAPSULE, LIQUID FILLED ORAL
Qty: 12 CAPSULE | Refills: 0 | OUTPATIENT

## 2022-12-09 ENCOUNTER — OFFICE VISIT (OUTPATIENT)
Dept: URBAN - METROPOLITAN AREA CLINIC 113 | Facility: CLINIC | Age: 32
End: 2022-12-09
Payer: COMMERCIAL

## 2022-12-09 VITALS
RESPIRATION RATE: 18 BRPM | DIASTOLIC BLOOD PRESSURE: 71 MMHG | BODY MASS INDEX: 27.97 KG/M2 | TEMPERATURE: 97.5 F | HEART RATE: 77 BPM | HEIGHT: 66 IN | WEIGHT: 174 LBS | SYSTOLIC BLOOD PRESSURE: 115 MMHG

## 2022-12-09 DIAGNOSIS — K50.80 CROHN'S DISEASE OF BOTH SMALL AND LARGE INTESTINE WITHOUT COMPLICATION: ICD-10-CM

## 2022-12-09 DIAGNOSIS — E55.9 VITAMIN D DEFICIENCY: ICD-10-CM

## 2022-12-09 PROCEDURE — 99213 OFFICE O/P EST LOW 20 MIN: CPT

## 2022-12-09 RX ORDER — ERGOCALCIFEROL 1.25 MG/1
TAKE 1 CAPSULE BY MOUTH ONCE A WEEK FOR 90 DAYS CAPSULE, LIQUID FILLED ORAL
Qty: 12 CAPSULE | Refills: 0 | Status: ACTIVE | COMMUNITY

## 2022-12-09 RX ORDER — PREDNISONE 20 MG/1
TAKE 2 TABLETS BY MOUTH EVERY DAY TABLET ORAL
Qty: 60 TABLET | Refills: 1 | Status: ON HOLD | COMMUNITY

## 2022-12-09 NOTE — HPI-TODAY'S VISIT:
Ms. Elizabeth is a 32-year-old woman presenting for a follow-up on Crohn's colitis.  She reports she had a colitis flareup from May to August, but was able to get it under control with steroids.  She has been in remission and on no medication since September.  She is following a gluten and dairy free diet.  She reports regular daily bowel movements without blood, mucus.  She is feeling very well overall.  She is still interested in getting approved for Entyvio maintenance therapy, but she is working on insurance.

## 2022-12-10 LAB
A/G RATIO: 1.2
ABSOLUTE BASOPHILS: 19
ABSOLUTE EOSINOPHILS: 50
ABSOLUTE LYMPHOCYTES: 1474
ABSOLUTE MONOCYTES: 580
ABSOLUTE NEUTROPHILS: 4177
ALBUMIN: 4.2
ALKALINE PHOSPHATASE: 82
ALT (SGPT): 11
AST (SGOT): 15
BASOPHILS: 0.3
BILIRUBIN, TOTAL: 0.4
BUN/CREATININE RATIO: (no result)
BUN: 11
CALCIUM: 9.5
CARBON DIOXIDE, TOTAL: 26
CHLORIDE: 102
CREATININE: 0.68
EGFR: 120
EOSINOPHILS: 0.8
GLOBULIN, TOTAL: 3.6
GLUCOSE: 66
HEMATOCRIT: 34.7
HEMOGLOBIN: 11.3
LYMPHOCYTES: 23.4
MCH: 29.6
MCHC: 32.6
MCV: 90.8
MONOCYTES: 9.2
MPV: 10.3
NEUTROPHILS: 66.3
PLATELET COUNT: 281
POTASSIUM: 3.9
PROTEIN, TOTAL: 7.8
RDW: 14.1
RED BLOOD CELL COUNT: 3.82
SODIUM: 138
VITAMIN B12: 432
VITAMIN D,25-OH,TOTAL,IA: 105
WHITE BLOOD CELL COUNT: 6.3

## 2022-12-11 PROBLEM — 71833008: Status: ACTIVE | Noted: 2022-01-26

## 2022-12-11 PROBLEM — 34713006: Status: ACTIVE | Noted: 2021-10-18

## 2022-12-27 ENCOUNTER — TELEPHONE ENCOUNTER (OUTPATIENT)
Dept: URBAN - METROPOLITAN AREA CLINIC 113 | Facility: CLINIC | Age: 32
End: 2022-12-27

## 2022-12-27 RX ORDER — PREDNISONE 20 MG/1
TAKE 2 TABLETS BY MOUTH EVERY DAY TABLET ORAL
Qty: 60 TABLET | Refills: 1 | Status: ON HOLD | COMMUNITY

## 2022-12-27 RX ORDER — ERGOCALCIFEROL 1.25 MG/1
TAKE 1 CAPSULE BY MOUTH ONCE A WEEK FOR 90 DAYS CAPSULE, LIQUID FILLED ORAL
Qty: 12 CAPSULE | Refills: 0 | Status: ACTIVE | COMMUNITY

## 2023-02-17 ENCOUNTER — ERX REFILL RESPONSE (OUTPATIENT)
Dept: URBAN - METROPOLITAN AREA CLINIC 113 | Facility: CLINIC | Age: 33
End: 2023-02-17

## 2023-02-17 RX ORDER — ERGOCALCIFEROL 1.25 MG/1
TAKE 1 CAPSULE BY MOUTH ONCE A WEEK FOR 90 DAYS CAPSULE, LIQUID FILLED ORAL
Qty: 12 CAPSULE | Refills: 1 | OUTPATIENT

## 2023-02-17 RX ORDER — ERGOCALCIFEROL 1.25 MG/1
TAKE 1 CAPSULE BY MOUTH ONCE A WEEK FOR 90 DAYS CAPSULE, LIQUID FILLED ORAL
Qty: 12 CAPSULE | Refills: 0 | OUTPATIENT

## 2023-02-24 ENCOUNTER — ERX REFILL RESPONSE (OUTPATIENT)
Dept: URBAN - METROPOLITAN AREA CLINIC 113 | Facility: CLINIC | Age: 33
End: 2023-02-24

## 2023-02-24 RX ORDER — CLINDAMYCIN PHOSPHATE TOPICAL SOLUTION USP, 1% 10 MG/ML
APPLY TO AFFECTED AREA EXTERNALLY TWICE A DAY FOR 30 DAYS SOLUTION TOPICAL
Qty: 60 MILLILITER | Refills: 2 | OUTPATIENT

## 2023-03-14 ENCOUNTER — OFFICE VISIT (OUTPATIENT)
Dept: URBAN - METROPOLITAN AREA CLINIC 113 | Facility: CLINIC | Age: 33
End: 2023-03-14
Payer: COMMERCIAL

## 2023-03-14 VITALS
WEIGHT: 171 LBS | HEIGHT: 66 IN | TEMPERATURE: 97.5 F | RESPIRATION RATE: 18 BRPM | HEART RATE: 83 BPM | DIASTOLIC BLOOD PRESSURE: 73 MMHG | SYSTOLIC BLOOD PRESSURE: 124 MMHG | BODY MASS INDEX: 27.48 KG/M2

## 2023-03-14 DIAGNOSIS — K50.819 CROHN'S DISEASE OF SMALL AND LARGE INTESTINES WITH COMPLICATION: ICD-10-CM

## 2023-03-14 DIAGNOSIS — E55.9 VITAMIN D DEFICIENCY: ICD-10-CM

## 2023-03-14 DIAGNOSIS — R11.0 NAUSEA: ICD-10-CM

## 2023-03-14 PROBLEM — 422587007: Status: ACTIVE | Noted: 2023-03-14

## 2023-03-14 PROCEDURE — 99214 OFFICE O/P EST MOD 30 MIN: CPT | Performed by: INTERNAL MEDICINE

## 2023-03-14 RX ORDER — ERGOCALCIFEROL 1.25 MG/1
TAKE 1 CAPSULE BY MOUTH ONCE A WEEK FOR 90 DAYS CAPSULE, LIQUID FILLED ORAL
Qty: 12 CAPSULE | Refills: 1 | Status: ACTIVE | COMMUNITY

## 2023-03-14 RX ORDER — CLINDAMYCIN PHOSPHATE TOPICAL SOLUTION USP, 1% 10 MG/ML
APPLY TO AFFECTED AREA EXTERNALLY TWICE A DAY FOR 30 DAYS SOLUTION TOPICAL
Qty: 60 MILLILITER | Refills: 2 | Status: ACTIVE | COMMUNITY

## 2023-03-14 RX ORDER — PREDNISONE 20 MG/1
TAKE 2 TABLETS BY MOUTH EVERY DAY TABLET ORAL
Qty: 60 TABLET | Refills: 1 | Status: ON HOLD | COMMUNITY

## 2023-03-14 RX ORDER — DRONABINOL 2.5 MG/1
1 CAPSULE IN THE EVENING OR AT BEDTIME CAPSULE ORAL ONCE A DAY
Qty: 30 CAPSULE | Refills: 3 | OUTPATIENT
Start: 2023-03-14 | End: 2023-07-12

## 2023-03-14 RX ORDER — VEDOLIZUMAB 300 MG/5ML
AS DIRECTED INJECTION, POWDER, LYOPHILIZED, FOR SOLUTION INTRAVENOUS
Qty: 300 | OUTPATIENT
Start: 2023-03-30 | End: 2023-06-28

## 2023-03-14 NOTE — HPI-TODAY'S VISIT:
Ms. Elizabeth is a 32-year-old woman with a history of ileocolonic Crohn's disease status post ileocolonic resection and diverting loop colostomy s/p takedown (12/21) currently not on medication for Crohn's disease presenting for follow up.  She was last seen on 12/9/2022 for follow-up regarding her small bowel and colonic Crohn's disease.  Prior treatment with anti-TNF agents were ineffective and/or poorly tolerated, and treatment with ustekinumab was discontinued due to lack of any clinical response.  We discussed beginning Vedolizumab pending her obtaining medical insurance.  She returns now reporting that she is obtained medical insurance and is interested in beginning Entyvio.  She is currently doing well and that she has dietary indiscretions.  She has been adhering to a gluten free, dairy free diet.  She typically has a fairly formed bowel movement once or twice daily except during her menstrual cycle when she tends to have looser consistency stool.  She denies any red blood per rectum or mucus in her stool.  No perianal symptoms.  She has not been on prednisone "for a long time".  She has had some right knee and left hip pain otherwise no significant joint pains.  No NSAID use.  No Imodium.   No tobacco use. No heartburn or difficulty swallowing.  She has chronic intermittent nausea with rare vomiting not improved with promtheazine or ondansetron.

## 2023-03-27 ENCOUNTER — TELEPHONE ENCOUNTER (OUTPATIENT)
Dept: URBAN - METROPOLITAN AREA CLINIC 113 | Facility: CLINIC | Age: 33
End: 2023-03-27

## 2023-03-30 ENCOUNTER — TELEPHONE ENCOUNTER (OUTPATIENT)
Dept: URBAN - METROPOLITAN AREA CLINIC 112 | Facility: CLINIC | Age: 33
End: 2023-03-30

## 2023-04-17 ENCOUNTER — TELEPHONE ENCOUNTER (OUTPATIENT)
Dept: URBAN - METROPOLITAN AREA CLINIC 113 | Facility: CLINIC | Age: 33
End: 2023-04-17

## 2023-04-26 ENCOUNTER — TELEPHONE ENCOUNTER (OUTPATIENT)
Dept: URBAN - METROPOLITAN AREA CLINIC 113 | Facility: CLINIC | Age: 33
End: 2023-04-26

## 2023-04-26 ENCOUNTER — OFFICE VISIT (OUTPATIENT)
Dept: URBAN - METROPOLITAN AREA CLINIC 112 | Facility: CLINIC | Age: 33
End: 2023-04-26
Payer: COMMERCIAL

## 2023-04-26 VITALS
DIASTOLIC BLOOD PRESSURE: 72 MMHG | RESPIRATION RATE: 16 BRPM | WEIGHT: 164 LBS | BODY MASS INDEX: 26.36 KG/M2 | TEMPERATURE: 98.5 F | SYSTOLIC BLOOD PRESSURE: 119 MMHG | HEART RATE: 99 BPM | HEIGHT: 66 IN

## 2023-04-26 DIAGNOSIS — K50.819 CROHN'S DISEASE OF BOTH SMALL AND LARGE INTESTINE WITH COMPLICATION: ICD-10-CM

## 2023-04-26 PROCEDURE — 96413 CHEMO IV INFUSION 1 HR: CPT | Performed by: INTERNAL MEDICINE

## 2023-04-26 RX ORDER — ERGOCALCIFEROL 1.25 MG/1
TAKE 1 CAPSULE BY MOUTH ONCE A WEEK FOR 90 DAYS CAPSULE, LIQUID FILLED ORAL
Qty: 12 CAPSULE | Refills: 1 | Status: ACTIVE | COMMUNITY

## 2023-04-26 RX ORDER — CLINDAMYCIN PHOSPHATE TOPICAL SOLUTION USP, 1% 10 MG/ML
APPLY TO AFFECTED AREA EXTERNALLY TWICE A DAY FOR 30 DAYS SOLUTION TOPICAL
Qty: 60 MILLILITER | Refills: 2 | Status: ACTIVE | COMMUNITY

## 2023-04-26 RX ORDER — VEDOLIZUMAB 300 MG/5ML
AS DIRECTED INJECTION, POWDER, LYOPHILIZED, FOR SOLUTION INTRAVENOUS
Qty: 300 | Status: ACTIVE | COMMUNITY
Start: 2023-03-30 | End: 2023-06-28

## 2023-04-26 RX ORDER — DRONABINOL 2.5 MG/1
1 CAPSULE IN THE EVENING OR AT BEDTIME CAPSULE ORAL ONCE A DAY
Qty: 30 CAPSULE | Refills: 3 | Status: ACTIVE | COMMUNITY
Start: 2023-03-14 | End: 2023-07-12

## 2023-04-26 RX ORDER — PREDNISONE 20 MG/1
TAKE 2 TABLETS BY MOUTH EVERY DAY TABLET ORAL
Qty: 60 TABLET | Refills: 1 | Status: ON HOLD | COMMUNITY

## 2023-05-10 ENCOUNTER — OFFICE VISIT (OUTPATIENT)
Dept: URBAN - METROPOLITAN AREA CLINIC 112 | Facility: CLINIC | Age: 33
End: 2023-05-10
Payer: COMMERCIAL

## 2023-05-10 ENCOUNTER — TELEPHONE ENCOUNTER (OUTPATIENT)
Dept: URBAN - METROPOLITAN AREA CLINIC 113 | Facility: CLINIC | Age: 33
End: 2023-05-10

## 2023-05-10 VITALS
DIASTOLIC BLOOD PRESSURE: 76 MMHG | BODY MASS INDEX: 25.88 KG/M2 | WEIGHT: 161 LBS | SYSTOLIC BLOOD PRESSURE: 118 MMHG | HEART RATE: 91 BPM | TEMPERATURE: 98.7 F | HEIGHT: 66 IN | RESPIRATION RATE: 16 BRPM

## 2023-05-10 DIAGNOSIS — K50.80 CROHN'S COLITIS: ICD-10-CM

## 2023-05-10 PROCEDURE — 96413 CHEMO IV INFUSION 1 HR: CPT | Performed by: INTERNAL MEDICINE

## 2023-05-10 RX ORDER — PREDNISONE 20 MG/1
TAKE 2 TABLETS BY MOUTH EVERY DAY TABLET ORAL
Qty: 60 TABLET | Refills: 1 | Status: ON HOLD | COMMUNITY

## 2023-05-10 RX ORDER — VEDOLIZUMAB 300 MG/5ML
AS DIRECTED INJECTION, POWDER, LYOPHILIZED, FOR SOLUTION INTRAVENOUS
Qty: 300 | Status: ACTIVE | COMMUNITY
Start: 2023-03-30 | End: 2023-06-28

## 2023-05-10 RX ORDER — DRONABINOL 2.5 MG/1
1 CAPSULE IN THE EVENING OR AT BEDTIME CAPSULE ORAL ONCE A DAY
Qty: 30 CAPSULE | Refills: 3 | Status: ACTIVE | COMMUNITY
Start: 2023-03-14 | End: 2023-07-12

## 2023-05-10 RX ORDER — CLINDAMYCIN PHOSPHATE TOPICAL SOLUTION USP, 1% 10 MG/ML
APPLY TO AFFECTED AREA EXTERNALLY TWICE A DAY FOR 30 DAYS SOLUTION TOPICAL
Qty: 60 MILLILITER | Refills: 2 | Status: ACTIVE | COMMUNITY

## 2023-05-10 RX ORDER — ERGOCALCIFEROL 1.25 MG/1
TAKE 1 CAPSULE BY MOUTH ONCE A WEEK FOR 90 DAYS CAPSULE, LIQUID FILLED ORAL
Qty: 12 CAPSULE | Refills: 1 | Status: ACTIVE | COMMUNITY

## 2023-05-11 PROBLEM — 16932000: Status: ACTIVE | Noted: 2023-05-11

## 2023-05-19 ENCOUNTER — TELEPHONE ENCOUNTER (OUTPATIENT)
Dept: URBAN - METROPOLITAN AREA CLINIC 113 | Facility: CLINIC | Age: 33
End: 2023-05-19

## 2023-05-20 ENCOUNTER — LAB OUTSIDE AN ENCOUNTER (OUTPATIENT)
Dept: URBAN - METROPOLITAN AREA CLINIC 113 | Facility: CLINIC | Age: 33
End: 2023-05-20

## 2023-05-23 LAB
A/G RATIO: 1.2
ABSOLUTE BASOPHILS: 20
ABSOLUTE EOSINOPHILS: 80
ABSOLUTE LYMPHOCYTES: 1345
ABSOLUTE MONOCYTES: 570
ABSOLUTE NEUTROPHILS: 2985
ALBUMIN: 4.1
ALKALINE PHOSPHATASE: 73
ALT (SGPT): 7
AMYLASE: 72
AST (SGOT): 10
BASOPHILS: 0.4
BILIRUBIN, TOTAL: 0.4
BUN/CREATININE RATIO: (no result)
BUN: 10
C-REACTIVE PROTEIN, QUANT: 11.7
CALCIUM: 9.2
CARBON DIOXIDE, TOTAL: 25
CHLORIDE: 106
CREATININE: 0.76
EGFR: 108
EOSINOPHILS: 1.6
GLOBULIN, TOTAL: 3.4
GLUCOSE: 79
HEMATOCRIT: 35.2
HEMOGLOBIN: 11.3
LIPASE: 12
LYMPHOCYTES: 26.9
MCH: 29.8
MCHC: 32.1
MCV: 92.9
MONOCYTES: 11.4
MPV: 9.9
NEUTROPHILS: 59.7
PLATELET COUNT: 290
POTASSIUM: 4.1
PROTEIN, TOTAL: 7.5
RDW: 12.8
RED BLOOD CELL COUNT: 3.79
SODIUM: 142
WHITE BLOOD CELL COUNT: 5

## 2023-05-26 ENCOUNTER — TELEPHONE ENCOUNTER (OUTPATIENT)
Dept: URBAN - METROPOLITAN AREA CLINIC 113 | Facility: CLINIC | Age: 33
End: 2023-05-26

## 2023-06-08 ENCOUNTER — OFFICE VISIT (OUTPATIENT)
Dept: URBAN - METROPOLITAN AREA CLINIC 112 | Facility: CLINIC | Age: 33
End: 2023-06-08
Payer: COMMERCIAL

## 2023-06-08 ENCOUNTER — TELEPHONE ENCOUNTER (OUTPATIENT)
Dept: URBAN - METROPOLITAN AREA CLINIC 113 | Facility: CLINIC | Age: 33
End: 2023-06-08

## 2023-06-08 VITALS
HEART RATE: 78 BPM | DIASTOLIC BLOOD PRESSURE: 68 MMHG | TEMPERATURE: 98.8 F | HEIGHT: 66 IN | BODY MASS INDEX: 25.55 KG/M2 | WEIGHT: 159 LBS | RESPIRATION RATE: 16 BRPM | SYSTOLIC BLOOD PRESSURE: 106 MMHG

## 2023-06-08 DIAGNOSIS — K50.80 CROHN'S COLITIS: ICD-10-CM

## 2023-06-08 PROCEDURE — 96413 CHEMO IV INFUSION 1 HR: CPT | Performed by: INTERNAL MEDICINE

## 2023-06-08 RX ORDER — DRONABINOL 2.5 MG/1
1 CAPSULE IN THE EVENING OR AT BEDTIME CAPSULE ORAL ONCE A DAY
Qty: 30 | Refills: 2
Start: 2023-03-14 | End: 2023-07-08

## 2023-06-08 RX ORDER — CLINDAMYCIN PHOSPHATE TOPICAL SOLUTION USP, 1% 10 MG/ML
APPLY TO AFFECTED AREA EXTERNALLY TWICE A DAY FOR 30 DAYS SOLUTION TOPICAL
Qty: 60 MILLILITER | Refills: 2 | Status: ACTIVE | COMMUNITY

## 2023-06-08 RX ORDER — ERGOCALCIFEROL 1.25 MG/1
TAKE 1 CAPSULE BY MOUTH ONCE A WEEK FOR 90 DAYS CAPSULE, LIQUID FILLED ORAL
Qty: 12 CAPSULE | Refills: 1 | Status: ACTIVE | COMMUNITY

## 2023-06-08 RX ORDER — VEDOLIZUMAB 300 MG/5ML
AS DIRECTED INJECTION, POWDER, LYOPHILIZED, FOR SOLUTION INTRAVENOUS
Qty: 300 | Status: ACTIVE | COMMUNITY
Start: 2023-03-30 | End: 2023-06-28

## 2023-06-08 RX ORDER — DRONABINOL 2.5 MG/1
1 CAPSULE IN THE EVENING OR AT BEDTIME CAPSULE ORAL ONCE A DAY
Qty: 30 CAPSULE | Refills: 3 | Status: ACTIVE | COMMUNITY
Start: 2023-03-14 | End: 2023-07-12

## 2023-06-08 RX ORDER — PREDNISONE 20 MG/1
TAKE 2 TABLETS BY MOUTH EVERY DAY TABLET ORAL
Qty: 60 TABLET | Refills: 1 | Status: ON HOLD | COMMUNITY

## 2023-06-20 ENCOUNTER — ERX REFILL RESPONSE (OUTPATIENT)
Dept: URBAN - METROPOLITAN AREA CLINIC 113 | Facility: CLINIC | Age: 33
End: 2023-06-20

## 2023-06-20 RX ORDER — CLINDAMYCIN PHOSPHATE TOPICAL SOLUTION USP, 1% 10 MG/ML
APPLY TO AFFECTED AREA EXTERNALLY TWICE A DAY FOR 30 DAYS SOLUTION TOPICAL
Qty: 60 MILLILITER | Refills: 2 | OUTPATIENT

## 2023-06-21 ENCOUNTER — OFFICE VISIT (OUTPATIENT)
Dept: URBAN - METROPOLITAN AREA CLINIC 113 | Facility: CLINIC | Age: 33
End: 2023-06-21
Payer: COMMERCIAL

## 2023-06-21 VITALS
SYSTOLIC BLOOD PRESSURE: 128 MMHG | BODY MASS INDEX: 24.14 KG/M2 | HEIGHT: 66 IN | TEMPERATURE: 97.7 F | HEART RATE: 104 BPM | RESPIRATION RATE: 16 BRPM | DIASTOLIC BLOOD PRESSURE: 84 MMHG | WEIGHT: 150.2 LBS

## 2023-06-21 DIAGNOSIS — R21 RASH: ICD-10-CM

## 2023-06-21 DIAGNOSIS — M79.89 RIGHT LEG SWELLING: ICD-10-CM

## 2023-06-21 DIAGNOSIS — R11.0 NAUSEA: ICD-10-CM

## 2023-06-21 DIAGNOSIS — K50.819 CROHN'S DISEASE OF SMALL AND LARGE INTESTINES WITH COMPLICATION: ICD-10-CM

## 2023-06-21 DIAGNOSIS — E55.9 VITAMIN D DEFICIENCY: ICD-10-CM

## 2023-06-21 PROCEDURE — 99214 OFFICE O/P EST MOD 30 MIN: CPT

## 2023-06-21 RX ORDER — PREDNISONE 20 MG/1
TAKE 2 TABLETS BY MOUTH EVERY DAY TABLET ORAL
Qty: 60 TABLET | Refills: 1 | Status: ON HOLD | COMMUNITY

## 2023-06-21 RX ORDER — VEDOLIZUMAB 300 MG/5ML
AS DIRECTED INJECTION, POWDER, LYOPHILIZED, FOR SOLUTION INTRAVENOUS
Qty: 300 | Status: ACTIVE | COMMUNITY
Start: 2023-03-30 | End: 2023-06-28

## 2023-06-21 RX ORDER — TRIAMCINOLONE ACETONIDE 1 MG/G
1 APPLICATION CREAM TOPICAL
Status: ACTIVE | COMMUNITY

## 2023-06-21 RX ORDER — CLINDAMYCIN PHOSPHATE TOPICAL SOLUTION USP, 1% 10 MG/ML
APPLY TO AFFECTED AREA EXTERNALLY TWICE A DAY FOR 30 DAYS SOLUTION TOPICAL
Qty: 60 MILLILITER | Refills: 2 | Status: ACTIVE | COMMUNITY

## 2023-06-21 RX ORDER — ERGOCALCIFEROL 1.25 MG/1
TAKE 1 CAPSULE BY MOUTH ONCE A WEEK FOR 90 DAYS CAPSULE, LIQUID FILLED ORAL
Qty: 12 CAPSULE | Refills: 1 | Status: ACTIVE | COMMUNITY

## 2023-06-21 RX ORDER — VEDOLIZUMAB 300 MG/5ML
AS DIRECTED INJECTION, POWDER, LYOPHILIZED, FOR SOLUTION INTRAVENOUS
OUTPATIENT

## 2023-06-21 RX ORDER — DRONABINOL 2.5 MG/1
1 CAPSULE IN THE EVENING OR AT BEDTIME CAPSULE ORAL ONCE A DAY
Qty: 30 | Refills: 2 | Status: ACTIVE | COMMUNITY
Start: 2023-03-14 | End: 2023-07-08

## 2023-06-21 NOTE — HPI-TODAY'S VISIT:
Ms. Elizabeth is a 32-year-old woman with a history of ileocolonic Crohn's disease status post ileocolonic resection and diverting loop colostomy s/p takedown (12/21) presents for follow-up.   She was last seen on 3/14/2023.  She has a history of complicated ileocolonic Crohn's disease status post ileocolonic resection and diverting loop colostomy status post takedown (December 2021) previously treated with anti-TNF agents and ustekinumab.  She was clinically doing well.  Given the complexity of her Crohn's disease, it was believed she would benefit from reinitiating biologic therapy with vedolizumab in an effort to maintain her remission.  Her previous vitamin D deficiency had improved with supplementation.  She was to trial Marinol for her chronic nausea as she reported no improvement with promethazine or ondansetron in the past.  We would consider a trial of buspirone. She has completed induction dosing.  At her last induction infusion on 5/10 she complained of discoloration to her right lower leg, fatigue, cold intolerance and occasional prior blood per rectum.  She was planned for labs.  She is due for her maintenance dose on 8/3/2023.  Labs 5/20/2023:Normal amylase, normal lipase, mildly elevated CRP 11.7, normal CMP, low RBC 3.79, low hemoglobin 11.3, normal hematocrit, normal MCV, normal platelet count.  On 6/8 she complained of increased amount of right ankle, leg and hip swelling since her last infusion.  She was evaluated at [facility] and given steroid injections which improved swelling.  She was also evaluated for necrosis of her joints due to history of steroid injections?  She was also found to have some arthritis.  Dr. Lind reviewed and was unsure if this was related though she was cleared to proceed with her infusion.  It appears she was never checked for a DVT.  She is "very happy with Entyvio." She states her bowels are finally formed fir the first time in over ten years. She has 2-3 formed bowel movements a day though she attributes the amount to her "retraining her bowels." She has only had one episode of small volume blood per rectum which she attributes to hemorrhoids. Denies abdominal pain, nausea or vomiting. She has lost almost 10 pounds since 6/8, but she admits she is not eating as much as her appetite has been poor. She has yet to start the Marinol as her pharmacy is currently filling. She also admits to stressful events over the last several months including passing of a friend.  Regarding her leg swelling and right shin discoloration, she states this started prior to her first Entyvio induction dose. The discoloration was bright red and not warm to the touch or painful. This has since improved after starting Entyvio. 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 She has had over 20 steroid injections in her life. She was told she has arthritis in her right knee due to misalignment of her right hip and back. She gets adjusted by a chiropractor which provides relief. She still has some residual swelling of her right ankle but her hip and right knee are improved. She is established with [facility].  She has had issues with insurance coverage of Entyvio. Her insurance states records are still missing in order to process the charges therefore she has yet to meet her deductible. This has left her with a bill at her orthopedic office.

## 2023-06-21 NOTE — PHYSICAL EXAM MUSCULOSKELETAL:
normal gait and station, mild swelling of right ankle, no pain with squeezing of right calf, no warmth or redness appreciated

## 2023-06-22 ENCOUNTER — TELEPHONE ENCOUNTER (OUTPATIENT)
Dept: URBAN - METROPOLITAN AREA CLINIC 113 | Facility: CLINIC | Age: 33
End: 2023-06-22

## 2023-06-26 ENCOUNTER — TELEPHONE ENCOUNTER (OUTPATIENT)
Dept: URBAN - METROPOLITAN AREA CLINIC 113 | Facility: CLINIC | Age: 33
End: 2023-06-26

## 2023-08-01 ENCOUNTER — OFFICE VISIT (OUTPATIENT)
Dept: URBAN - METROPOLITAN AREA CLINIC 43 | Facility: CLINIC | Age: 33
End: 2023-08-01
Payer: COMMERCIAL

## 2023-08-01 VITALS
WEIGHT: 152 LBS | BODY MASS INDEX: 24.43 KG/M2 | SYSTOLIC BLOOD PRESSURE: 137 MMHG | HEIGHT: 66 IN | HEART RATE: 80 BPM | RESPIRATION RATE: 18 BRPM | DIASTOLIC BLOOD PRESSURE: 83 MMHG | TEMPERATURE: 97.7 F

## 2023-08-01 DIAGNOSIS — K50.80 CROHN'S COLITIS: ICD-10-CM

## 2023-08-01 PROCEDURE — 96413 CHEMO IV INFUSION 1 HR: CPT | Performed by: INTERNAL MEDICINE

## 2023-08-01 RX ORDER — CLINDAMYCIN PHOSPHATE TOPICAL SOLUTION USP, 1% 10 MG/ML
APPLY TO AFFECTED AREA EXTERNALLY TWICE A DAY FOR 30 DAYS SOLUTION TOPICAL
Qty: 60 MILLILITER | Refills: 2 | Status: ACTIVE | COMMUNITY

## 2023-08-01 RX ORDER — ERGOCALCIFEROL 1.25 MG/1
TAKE 1 CAPSULE BY MOUTH ONCE A WEEK FOR 90 DAYS CAPSULE, LIQUID FILLED ORAL
Qty: 12 CAPSULE | Refills: 1 | Status: ACTIVE | COMMUNITY

## 2023-08-01 RX ORDER — VEDOLIZUMAB 300 MG/5ML
AS DIRECTED INJECTION, POWDER, LYOPHILIZED, FOR SOLUTION INTRAVENOUS
Status: ACTIVE | COMMUNITY

## 2023-08-01 RX ORDER — PREDNISONE 20 MG/1
TAKE 2 TABLETS BY MOUTH EVERY DAY TABLET ORAL
Qty: 60 TABLET | Refills: 1 | Status: ON HOLD | COMMUNITY

## 2023-08-01 RX ORDER — TRIAMCINOLONE ACETONIDE 1 MG/G
1 APPLICATION CREAM TOPICAL
Status: ACTIVE | COMMUNITY

## 2023-08-04 ENCOUNTER — TELEPHONE ENCOUNTER (OUTPATIENT)
Dept: URBAN - METROPOLITAN AREA CLINIC 113 | Facility: CLINIC | Age: 33
End: 2023-08-04

## 2023-08-05 PROBLEM — 309298003: Status: ACTIVE | Noted: 2023-08-05

## 2023-08-06 ENCOUNTER — ERX REFILL RESPONSE (OUTPATIENT)
Dept: URBAN - METROPOLITAN AREA CLINIC 113 | Facility: CLINIC | Age: 33
End: 2023-08-06

## 2023-08-06 RX ORDER — ERGOCALCIFEROL 1.25 MG/1
TAKE 1 CAPSULE BY MOUTH ONCE A WEEK FOR 90 DAYS CAPSULE, LIQUID FILLED ORAL
Qty: 12 CAPSULE | Refills: 1 | OUTPATIENT

## 2023-08-08 ENCOUNTER — TELEPHONE ENCOUNTER (OUTPATIENT)
Dept: URBAN - METROPOLITAN AREA CLINIC 113 | Facility: CLINIC | Age: 33
End: 2023-08-08

## 2023-08-08 LAB
A/G RATIO: 1.2
ABSOLUTE BASOPHILS: 31
ABSOLUTE EOSINOPHILS: 42
ABSOLUTE LYMPHOCYTES: 1586
ABSOLUTE MONOCYTES: 629
ABSOLUTE NEUTROPHILS: 2912
ALBUMIN: 4.1
ALKALINE PHOSPHATASE: 70
ALT (SGPT): 11
AST (SGOT): 13
BASOPHILS: 0.6
BILIRUBIN, TOTAL: 0.3
BUN/CREATININE RATIO: (no result)
BUN: 8
CALCIUM: 9.4
CARBON DIOXIDE, TOTAL: 26
CHLORIDE: 105
CREATININE: 0.8
EGFR: 102
EOSINOPHILS: 0.8
GLOBULIN, TOTAL: 3.4
GLUCOSE: 72
HEMATOCRIT: 36.9
HEMOGLOBIN: 12.2
HS CRP: >10
LYMPHOCYTES: 30.5
MCH: 31.9
MCHC: 33.1
MCV: 96.3
MONOCYTES: 12.1
MPV: 10.5
NEUTROPHILS: 56
PLATELET COUNT: 260
POTASSIUM: 3.9
PROTEIN, TOTAL: 7.5
RDW: 12.6
RED BLOOD CELL COUNT: 3.83
SODIUM: 140
WHITE BLOOD CELL COUNT: 5.2

## 2023-08-09 ENCOUNTER — OFFICE VISIT (OUTPATIENT)
Dept: URBAN - METROPOLITAN AREA CLINIC 112 | Facility: CLINIC | Age: 33
End: 2023-08-09

## 2023-08-17 ENCOUNTER — TELEPHONE ENCOUNTER (OUTPATIENT)
Dept: URBAN - METROPOLITAN AREA CLINIC 113 | Facility: CLINIC | Age: 33
End: 2023-08-17

## 2023-08-31 ENCOUNTER — TELEPHONE ENCOUNTER (OUTPATIENT)
Dept: URBAN - METROPOLITAN AREA CLINIC 113 | Facility: CLINIC | Age: 33
End: 2023-08-31

## 2023-09-01 ENCOUNTER — WEB ENCOUNTER (OUTPATIENT)
Dept: URBAN - METROPOLITAN AREA SURGERY CENTER 25 | Facility: SURGERY CENTER | Age: 33
End: 2023-09-01

## 2023-09-07 ENCOUNTER — OUT OF OFFICE VISIT (OUTPATIENT)
Dept: URBAN - METROPOLITAN AREA SURGERY CENTER 25 | Facility: SURGERY CENTER | Age: 33
End: 2023-09-07
Payer: COMMERCIAL

## 2023-09-07 ENCOUNTER — CLAIMS CREATED FROM THE CLAIM WINDOW (OUTPATIENT)
Dept: URBAN - METROPOLITAN AREA SURGERY CENTER 25 | Facility: SURGERY CENTER | Age: 33
End: 2023-09-07

## 2023-09-07 ENCOUNTER — CLAIMS CREATED FROM THE CLAIM WINDOW (OUTPATIENT)
Dept: URBAN - METROPOLITAN AREA CLINIC 4 | Facility: CLINIC | Age: 33
End: 2023-09-07
Payer: COMMERCIAL

## 2023-09-07 ENCOUNTER — TELEPHONE ENCOUNTER (OUTPATIENT)
Dept: URBAN - METROPOLITAN AREA CLINIC 113 | Facility: CLINIC | Age: 33
End: 2023-09-07

## 2023-09-07 DIAGNOSIS — K62.4 STENOSIS OF ANUS AND RECTUM: ICD-10-CM

## 2023-09-07 DIAGNOSIS — K62.6 ULCER OF ANUS AND RECTUM: ICD-10-CM

## 2023-09-07 DIAGNOSIS — K50.812 CROHN'S DISEASE OF BOTH SMALL AND LARGE INTESTINE WITH INTESTINAL OBSTRUCTION: ICD-10-CM

## 2023-09-07 DIAGNOSIS — K50.119 CROHN'S COLITIS, UNSPECIFIED COMPLICATION: ICD-10-CM

## 2023-09-07 DIAGNOSIS — K91.89 AFFERENT LOOP SYNDROME: ICD-10-CM

## 2023-09-07 DIAGNOSIS — K63.3 APHTHOUS ULCER OF COLON: ICD-10-CM

## 2023-09-07 DIAGNOSIS — K62.4 ACQUIRED ANAL STENOSIS: ICD-10-CM

## 2023-09-07 DIAGNOSIS — K62.6 ANAL ULCER: ICD-10-CM

## 2023-09-07 DIAGNOSIS — K63.89 APPENDICITIS EPIPLOICA: ICD-10-CM

## 2023-09-07 DIAGNOSIS — K63.3 ULCERATION OF INTESTINE: ICD-10-CM

## 2023-09-07 DIAGNOSIS — K91.89 OTHER POSTPROCEDURAL COMPLICATIONS AND DISORDERS OF DIGESTIVE SYSTEM: ICD-10-CM

## 2023-09-07 PROCEDURE — G8907 PT DOC NO EVENTS ON DISCHARG: HCPCS | Performed by: INTERNAL MEDICINE

## 2023-09-07 PROCEDURE — 88342 IMHCHEM/IMCYTCHM 1ST ANTB: CPT | Performed by: PATHOLOGY

## 2023-09-07 PROCEDURE — 00811 ANES LWR INTST NDSC NOS: CPT | Performed by: ANESTHESIOLOGY

## 2023-09-07 PROCEDURE — 00811 ANES LWR INTST NDSC NOS: CPT | Performed by: NURSE ANESTHETIST, CERTIFIED REGISTERED

## 2023-09-07 PROCEDURE — 88341 IMHCHEM/IMCYTCHM EA ADD ANTB: CPT | Performed by: PATHOLOGY

## 2023-09-07 PROCEDURE — 88305 TISSUE EXAM BY PATHOLOGIST: CPT | Performed by: PATHOLOGY

## 2023-09-07 PROCEDURE — 45380 COLONOSCOPY AND BIOPSY: CPT | Performed by: INTERNAL MEDICINE

## 2023-09-07 RX ORDER — TRIAMCINOLONE ACETONIDE 1 MG/G
1 APPLICATION CREAM TOPICAL
Status: ACTIVE | COMMUNITY

## 2023-09-07 RX ORDER — VEDOLIZUMAB 300 MG/5ML
AS DIRECTED INJECTION, POWDER, LYOPHILIZED, FOR SOLUTION INTRAVENOUS
Status: ACTIVE | COMMUNITY

## 2023-09-07 RX ORDER — CLINDAMYCIN PHOSPHATE TOPICAL SOLUTION USP, 1% 10 MG/ML
APPLY TO AFFECTED AREA EXTERNALLY TWICE A DAY FOR 30 DAYS SOLUTION TOPICAL
Qty: 60 MILLILITER | Refills: 2 | Status: ACTIVE | COMMUNITY

## 2023-09-07 RX ORDER — ERGOCALCIFEROL 1.25 MG/1
TAKE 1 CAPSULE BY MOUTH ONCE A WEEK FOR 90 DAYS CAPSULE, LIQUID FILLED ORAL
Qty: 12 CAPSULE | Refills: 1 | Status: ACTIVE | COMMUNITY

## 2023-09-07 RX ORDER — PREDNISONE 20 MG/1
TAKE 2 TABLETS BY MOUTH EVERY DAY TABLET ORAL
Qty: 60 TABLET | Refills: 1 | Status: ON HOLD | COMMUNITY

## 2023-09-18 ENCOUNTER — TELEPHONE ENCOUNTER (OUTPATIENT)
Dept: URBAN - METROPOLITAN AREA CLINIC 113 | Facility: CLINIC | Age: 33
End: 2023-09-18

## 2023-09-25 ENCOUNTER — OFFICE VISIT (OUTPATIENT)
Dept: URBAN - METROPOLITAN AREA CLINIC 112 | Facility: CLINIC | Age: 33
End: 2023-09-25
Payer: COMMERCIAL

## 2023-09-25 VITALS
HEART RATE: 79 BPM | TEMPERATURE: 98.2 F | RESPIRATION RATE: 16 BRPM | SYSTOLIC BLOOD PRESSURE: 117 MMHG | BODY MASS INDEX: 24.11 KG/M2 | WEIGHT: 150 LBS | HEIGHT: 66 IN | DIASTOLIC BLOOD PRESSURE: 69 MMHG

## 2023-09-25 DIAGNOSIS — K50.818 CROHN'S DISEASE OF BOTH SMALL AND LG INT W OTH COMPLICATION: ICD-10-CM

## 2023-09-25 PROCEDURE — 96413 CHEMO IV INFUSION 1 HR: CPT | Performed by: STUDENT IN AN ORGANIZED HEALTH CARE EDUCATION/TRAINING PROGRAM

## 2023-09-25 RX ORDER — ERGOCALCIFEROL 1.25 MG/1
TAKE 1 CAPSULE BY MOUTH ONCE A WEEK FOR 90 DAYS CAPSULE, LIQUID FILLED ORAL
Qty: 12 CAPSULE | Refills: 1 | Status: ACTIVE | COMMUNITY

## 2023-09-25 RX ORDER — VEDOLIZUMAB 300 MG/5ML
AS DIRECTED INJECTION, POWDER, LYOPHILIZED, FOR SOLUTION INTRAVENOUS
Status: ACTIVE | COMMUNITY

## 2023-09-25 RX ORDER — TRIAMCINOLONE ACETONIDE 1 MG/G
1 APPLICATION CREAM TOPICAL
Status: ACTIVE | COMMUNITY

## 2023-09-25 RX ORDER — CLINDAMYCIN PHOSPHATE TOPICAL SOLUTION USP, 1% 10 MG/ML
APPLY TO AFFECTED AREA EXTERNALLY TWICE A DAY FOR 30 DAYS SOLUTION TOPICAL
Qty: 60 MILLILITER | Refills: 2 | Status: ACTIVE | COMMUNITY

## 2023-09-25 RX ORDER — PREDNISONE 20 MG/1
TAKE 2 TABLETS BY MOUTH EVERY DAY TABLET ORAL
Qty: 60 TABLET | Refills: 1 | Status: ON HOLD | COMMUNITY

## 2023-09-26 ENCOUNTER — OFFICE VISIT (OUTPATIENT)
Dept: URBAN - METROPOLITAN AREA CLINIC 107 | Facility: CLINIC | Age: 33
End: 2023-09-26
Payer: COMMERCIAL

## 2023-09-26 ENCOUNTER — OFFICE VISIT (OUTPATIENT)
Dept: URBAN - METROPOLITAN AREA CLINIC 107 | Facility: CLINIC | Age: 33
End: 2023-09-26

## 2023-09-26 VITALS
HEART RATE: 71 BPM | TEMPERATURE: 97.9 F | DIASTOLIC BLOOD PRESSURE: 84 MMHG | WEIGHT: 149 LBS | RESPIRATION RATE: 18 BRPM | SYSTOLIC BLOOD PRESSURE: 127 MMHG | BODY MASS INDEX: 23.95 KG/M2 | HEIGHT: 66 IN

## 2023-09-26 DIAGNOSIS — K50.819 CROHN'S DISEASE OF SMALL AND LARGE INTESTINES WITH COMPLICATION: ICD-10-CM

## 2023-09-26 DIAGNOSIS — Z79.899 HIGH RISK MEDICATION USE: ICD-10-CM

## 2023-09-26 DIAGNOSIS — K62.4 ANAL STRICTURE: ICD-10-CM

## 2023-09-26 PROCEDURE — 99214 OFFICE O/P EST MOD 30 MIN: CPT | Performed by: INTERNAL MEDICINE

## 2023-09-26 RX ORDER — CLINDAMYCIN PHOSPHATE TOPICAL SOLUTION USP, 1% 10 MG/ML
APPLY TO AFFECTED AREA EXTERNALLY TWICE A DAY FOR 30 DAYS SOLUTION TOPICAL
Qty: 60 MILLILITER | Refills: 2 | Status: ACTIVE | COMMUNITY

## 2023-09-26 RX ORDER — TRIAMCINOLONE ACETONIDE 1 MG/G
1 APPLICATION CREAM TOPICAL
Status: ACTIVE | COMMUNITY

## 2023-09-26 RX ORDER — VEDOLIZUMAB 300 MG/5ML
AS DIRECTED INJECTION, POWDER, LYOPHILIZED, FOR SOLUTION INTRAVENOUS
Qty: 1 | Refills: 8

## 2023-09-26 RX ORDER — PREDNISONE 20 MG/1
TAKE 2 TABLETS BY MOUTH EVERY DAY TABLET ORAL
Qty: 60 TABLET | Refills: 1 | Status: ON HOLD | COMMUNITY

## 2023-09-26 RX ORDER — ERGOCALCIFEROL 1.25 MG/1
TAKE 1 CAPSULE BY MOUTH ONCE A WEEK FOR 90 DAYS CAPSULE, LIQUID FILLED ORAL
Qty: 12 CAPSULE | Refills: 1 | Status: ACTIVE | COMMUNITY

## 2023-09-26 RX ORDER — VEDOLIZUMAB 300 MG/5ML
AS DIRECTED INJECTION, POWDER, LYOPHILIZED, FOR SOLUTION INTRAVENOUS
Status: ACTIVE | COMMUNITY

## 2023-09-26 NOTE — HPI-TODAY'S VISIT:
Ms. Elizabeth is a 33-year-old woman with a history of ileocolonic Crohn's disease status post ileocolonic resection and diverting loop colostomy s/p takedown (12/21) on Entyvio 300 mg every 8 weeks presents for follow-up after colonoscopy performed to assess disease activity.  The colonoscopy was performed on 9/7/2023 with findings notable for an anal stricture with chronic changes from perianal Crohn's disease that was able to be traversed with an upper endoscope, patent end-to-side ileocolonic anastomosis with ulceration status post biopsy showing patchy chronic colitis with focal erosion consistent with anastomotic changes negative for dysplasia, normal neoterminal ileum on limited evaluation, moderate colon stricture with overlying inflammatory changes at 25 cm from the anal verge status post biopsy showing no significant abnormality, patchy colon erythema with superficial ulcerations throughout the colon (distal >proximal) status post biopsy showing no significant abnormalities in the proximal colon and patchy chronic active colitis without dysplasia consistent with Crohn's disease in the distal colon.  She reports that she is overall doing fairly well.  About 1 week prior to her every 8-week Entyvio infusion she begins to have "Crohn's symptoms".  Her last infusion of Entyvio was on 9/25/2023 (yesterday). She denies any blood per rectum, NSAID use or tobacco use.  She strains to pass stool if more formed consistency.  She has been seeing cardiology/Dr. Haley regarding issues of bradycardia/atrial fibrillation with her next appointment scheduled on 10/12/2023.  No nausea, vomiting, heartburn or difficulty swallowing.

## 2023-10-05 ENCOUNTER — TELEPHONE ENCOUNTER (OUTPATIENT)
Dept: URBAN - METROPOLITAN AREA CLINIC 113 | Facility: CLINIC | Age: 33
End: 2023-10-05

## 2023-10-10 PROBLEM — 69914001: Status: ACTIVE | Noted: 2023-10-10

## 2023-10-11 ENCOUNTER — TELEPHONE ENCOUNTER (OUTPATIENT)
Dept: URBAN - METROPOLITAN AREA CLINIC 112 | Facility: CLINIC | Age: 33
End: 2023-10-11

## 2023-10-25 ENCOUNTER — OFFICE VISIT (OUTPATIENT)
Dept: URBAN - METROPOLITAN AREA CLINIC 113 | Facility: CLINIC | Age: 33
End: 2023-10-25

## 2023-11-07 ENCOUNTER — TELEPHONE ENCOUNTER (OUTPATIENT)
Dept: URBAN - METROPOLITAN AREA CLINIC 112 | Facility: CLINIC | Age: 33
End: 2023-11-07

## 2023-11-20 ENCOUNTER — OFFICE VISIT (OUTPATIENT)
Dept: URBAN - METROPOLITAN AREA CLINIC 112 | Facility: CLINIC | Age: 33
End: 2023-11-20
Payer: COMMERCIAL

## 2023-11-20 ENCOUNTER — TELEPHONE ENCOUNTER (OUTPATIENT)
Dept: URBAN - METROPOLITAN AREA CLINIC 113 | Facility: CLINIC | Age: 33
End: 2023-11-20

## 2023-11-20 VITALS
WEIGHT: 149 LBS | RESPIRATION RATE: 18 BRPM | SYSTOLIC BLOOD PRESSURE: 110 MMHG | DIASTOLIC BLOOD PRESSURE: 65 MMHG | HEIGHT: 66 IN | TEMPERATURE: 97.5 F | BODY MASS INDEX: 23.95 KG/M2 | HEART RATE: 72 BPM

## 2023-11-20 DIAGNOSIS — K50.818 CROHN'S DISEASE OF BOTH SMALL AND LG INT W OTH COMPLICATION: ICD-10-CM

## 2023-11-20 PROCEDURE — 96413 CHEMO IV INFUSION 1 HR: CPT | Performed by: INTERNAL MEDICINE

## 2023-11-20 RX ORDER — CLINDAMYCIN PHOSPHATE TOPICAL SOLUTION USP, 1% 10 MG/ML
APPLY TO AFFECTED AREA EXTERNALLY TWICE A DAY FOR 30 DAYS SOLUTION TOPICAL
Qty: 60 MILLILITER | Refills: 2 | Status: ACTIVE | COMMUNITY

## 2023-11-20 RX ORDER — TRIAMCINOLONE ACETONIDE 1 MG/G
1 APPLICATION CREAM TOPICAL
Status: ACTIVE | COMMUNITY

## 2023-11-20 RX ORDER — ERGOCALCIFEROL 1.25 MG/1
TAKE 1 CAPSULE BY MOUTH ONCE A WEEK FOR 90 DAYS CAPSULE, LIQUID FILLED ORAL
Qty: 12 CAPSULE | Refills: 1 | Status: ACTIVE | COMMUNITY

## 2023-11-20 RX ORDER — VEDOLIZUMAB 300 MG/5ML
AS DIRECTED INJECTION, POWDER, LYOPHILIZED, FOR SOLUTION INTRAVENOUS
Qty: 1 | Refills: 8 | Status: ACTIVE | COMMUNITY

## 2023-11-20 RX ORDER — PREDNISONE 20 MG/1
TAKE 2 TABLETS BY MOUTH EVERY DAY TABLET ORAL
Qty: 60 TABLET | Refills: 1 | Status: ON HOLD | COMMUNITY

## 2023-11-21 ENCOUNTER — TELEPHONE ENCOUNTER (OUTPATIENT)
Dept: URBAN - METROPOLITAN AREA CLINIC 113 | Facility: CLINIC | Age: 33
End: 2023-11-21

## 2023-12-05 ENCOUNTER — TELEPHONE ENCOUNTER (OUTPATIENT)
Dept: URBAN - METROPOLITAN AREA CLINIC 113 | Facility: CLINIC | Age: 33
End: 2023-12-05

## 2023-12-20 ENCOUNTER — OFFICE VISIT (OUTPATIENT)
Dept: URBAN - METROPOLITAN AREA CLINIC 113 | Facility: CLINIC | Age: 33
End: 2023-12-20
Payer: COMMERCIAL

## 2023-12-20 ENCOUNTER — TELEPHONE ENCOUNTER (OUTPATIENT)
Dept: URBAN - METROPOLITAN AREA CLINIC 113 | Facility: CLINIC | Age: 33
End: 2023-12-20

## 2023-12-20 VITALS
HEIGHT: 66 IN | BODY MASS INDEX: 24.59 KG/M2 | SYSTOLIC BLOOD PRESSURE: 109 MMHG | HEART RATE: 83 BPM | RESPIRATION RATE: 18 BRPM | DIASTOLIC BLOOD PRESSURE: 75 MMHG | TEMPERATURE: 97.3 F | WEIGHT: 153 LBS

## 2023-12-20 DIAGNOSIS — Z79.899 HIGH RISK MEDICATIONS (NOT ANTICOAGULANTS) LONG-TERM USE: ICD-10-CM

## 2023-12-20 DIAGNOSIS — K62.4 ANAL STRICTURE: ICD-10-CM

## 2023-12-20 DIAGNOSIS — K50.819 CROHN'S DISEASE OF SMALL AND LARGE INTESTINES WITH COMPLICATION: ICD-10-CM

## 2023-12-20 DIAGNOSIS — M25.50 MULTIPLE JOINT PAIN: ICD-10-CM

## 2023-12-20 PROCEDURE — 99214 OFFICE O/P EST MOD 30 MIN: CPT | Performed by: INTERNAL MEDICINE

## 2023-12-20 RX ORDER — CLINDAMYCIN PHOSPHATE TOPICAL SOLUTION USP, 1% 10 MG/ML
APPLY TO AFFECTED AREA EXTERNALLY TWICE A DAY FOR 30 DAYS SOLUTION TOPICAL
Qty: 60 MILLILITER | Refills: 2 | Status: ACTIVE | COMMUNITY

## 2023-12-20 RX ORDER — TRIAMCINOLONE ACETONIDE 1 MG/G
1 APPLICATION CREAM TOPICAL
Status: ACTIVE | COMMUNITY

## 2023-12-20 RX ORDER — VEDOLIZUMAB 300 MG/5ML
AS DIRECTED INJECTION, POWDER, LYOPHILIZED, FOR SOLUTION INTRAVENOUS
Qty: 1 | Refills: 8 | Status: ACTIVE | COMMUNITY

## 2023-12-20 RX ORDER — PREDNISONE 20 MG/1
TAKE 2 TABLETS BY MOUTH EVERY DAY TABLET ORAL
Qty: 60 TABLET | Refills: 1 | Status: ON HOLD | COMMUNITY

## 2023-12-20 RX ORDER — ERGOCALCIFEROL 1.25 MG/1
TAKE 1 CAPSULE BY MOUTH ONCE A WEEK FOR 90 DAYS CAPSULE, LIQUID FILLED ORAL
Qty: 12 CAPSULE | Refills: 1 | Status: ACTIVE | COMMUNITY

## 2023-12-20 NOTE — HPI-OTHER HISTORIES
Colonoscopy (9/7/2023) with findings notable for an anal stricture with chronic changes from perianal Crohn's disease that was able to be traversed with an upper endoscope, patent end-to-side ileocolonic anastomosis with ulceration status post biopsy showing patchy chronic colitis with focal erosion consistent with anastomotic changes negative for dysplasia, normal neoterminal ileum on limited evaluation, moderate colon stricture with overlying inflammatory changes at 25 cm from the anal verge status post biopsy showing no significant abnormality, patchy colon erythema with superficial ulcerations throughout the colon (distal >proximal) status post biopsy showing no significant abnormalities in the proximal colon and patchy chronic active colitis without dysplasia consistent with Crohn's disease in the distal colon. MRI of the brain that was unremarkable.   EGD 12/5/19: Z line irregular with distal esophageal hyperemia consistent with GERD. Mild gastric erythema, biopsied. Normal duodenum. Gastric antral biopsies revealed mild chronic gastritis with antral and oxyntic mucosa negative for H. pylori. Colonoscopy 12/5/19: Adequate bowel preparation. Perianal skin tags on perianal exam. Stricture in the distal rectum status post dilation with advancement of the scope, biopsied. Multiple ulcers in the rectum, in the sigmoid colon, in the descending colon, in the transverse colon and at the colonic anastomosis, biopsied. Patent end-to-side ileocolonic anastomosis, characterized by healthy-appearing mucosa. Unable to intubate neoterminal ileum. Rectal stricture biopsy revealed chronic active colitis, squamous mucosa with acute and chronic inflammation. Random colon biopsies revealed chronic active colitis. Colonoscopy (8/9/18, Humira every 2 weeks): Few benign ulcers in the distal ileum status post biopsy, if you ulcers at the colon anastomosis in the transverse colon, moderate Crohn's colitis of the sigmoid and rectum. Terminal ileal biopsies show mildly active chronic ileitis, ileocolonic anastomosis shows severely active chronic enteritis, biopsies of the sigmoid colon and rectum showed severely active chronic colitis. None of the specimens had any changes of dysplasia or granulomas. Upper GI series with small bowel follow through (8/22/17): rapid small bowel transit time, status post right hemicolectomy

## 2023-12-20 NOTE — HPI-TODAY'S VISIT:
Ms. Elizabeth is a 33-year-old woman with a history of ileocolonic Crohn's disease status post ileocolonic resection and diverting loop colostomy s/p takedown (12/21) on Entyvio 300 mg every 6 weeks presents for follow up.  She was last seen in the office on 9/26/2023 for follow-up regarding small bowel and colonic Crohn's disease complicated by a symptomatic anal stricture (traversed with an upper endoscope), patent end-to-side ileocolonic anastomosis with ulceration status post biopsy showing patchy chronic colitis with focal erosion consistent with anastomotic changes negative for dysplasia, normal neoterminal ileum on limited evaluation, moderate colon stricture with overlying inflammatory changes at 25 cm in the anal verge status post biopsy showing no significant abnormality, patchy colon erythema with superficial ulcerations throughout the colon (distal greater than proximal) status post biopsy showing no significant abnormalities in the proximal colon and patchy chronic active colitis without dysplasia consistent with Crohn's disease in the distal colon.    After that visit she was changed from Entyvio 300 mg every 8 weeks to every 6 weeks.She feels that her GI symptoms are doing "pretty well".  She reports her bowel movements have been "a bit off" as she has been taking Augmentin and Diflucan for I&D of a papillary rectal abscess identified on her exam under anesthesia and anal stricture dilation (10/25/23, Dr. Pantoja).  She is having 1 to 2 bowel movements daily that have been easier to pass without red blood or mucus.  She has been having increasing amounts of swollen joints bilaterally, ankles, knees, hips, that improves with prednisone.  She also notices a peculiar skin change, mostly pigment change, on the left leg.  She reports having a negative TB skin test in October for work.  Her next Entyvio infusion is due on 1/3/2024.

## 2023-12-23 LAB
A/G RATIO: 1.2
ABSOLUTE BASOPHILS: 39
ABSOLUTE EOSINOPHILS: 162
ABSOLUTE LYMPHOCYTES: 1439
ABSOLUTE MONOCYTES: 510
ABSOLUTE NEUTROPHILS: 3450
ALBUMIN: 4.1
ALKALINE PHOSPHATASE: 76
ALT (SGPT): 18
AST (SGOT): 30
BASOPHILS: 0.7
BILIRUBIN, TOTAL: 0.2
BUN/CREATININE RATIO: (no result)
BUN: 8
C-REACTIVE PROTEIN, QUANT: 4.8
CALCIUM: 9.4
CARBON DIOXIDE, TOTAL: 27
CHLORIDE: 102
CREATININE: 0.69
EGFR: 119
EOSINOPHILS: 2.9
GLOBULIN, TOTAL: 3.3
GLUCOSE: 84
HEMATOCRIT: 34.7
HEMOGLOBIN: 11.7
LYMPHOCYTES: 25.7
MCH: 32.1
MCHC: 33.7
MCV: 95.3
MITOGEN-NIL: >10
MONOCYTES: 9.1
MPV: 10.3
NEUTROPHILS: 61.6
PLATELET COUNT: 285
POTASSIUM: 4.2
PROTEIN, TOTAL: 7.4
QUANTIFERON NIL VALUE: 0.06
QUANTIFERON TB1 AG VALUE: 0.02
QUANTIFERON TB2 AG VALUE: 0.02
QUANTIFERON-TB GOLD PLUS: NEGATIVE
RDW: 12.4
RED BLOOD CELL COUNT: 3.64
SODIUM: 138
VITAMIN B12: 416
VITAMIN D,25-OH,TOTAL,IA: 80
WHITE BLOOD CELL COUNT: 5.6

## 2023-12-25 ENCOUNTER — TELEPHONE ENCOUNTER (OUTPATIENT)
Dept: URBAN - METROPOLITAN AREA CLINIC 113 | Facility: CLINIC | Age: 33
End: 2023-12-25

## 2023-12-25 PROBLEM — 35678005: Status: ACTIVE | Noted: 2023-12-25

## 2024-01-03 ENCOUNTER — OFFICE VISIT (OUTPATIENT)
Dept: URBAN - METROPOLITAN AREA CLINIC 112 | Facility: CLINIC | Age: 34
End: 2024-01-03

## 2024-01-03 ENCOUNTER — LAB OUTSIDE AN ENCOUNTER (OUTPATIENT)
Dept: URBAN - METROPOLITAN AREA CLINIC 113 | Facility: CLINIC | Age: 34
End: 2024-01-03

## 2024-01-11 ENCOUNTER — OFFICE VISIT (OUTPATIENT)
Dept: URBAN - METROPOLITAN AREA CLINIC 97 | Facility: CLINIC | Age: 34
End: 2024-01-11
Payer: COMMERCIAL

## 2024-01-11 ENCOUNTER — TELEPHONE ENCOUNTER (OUTPATIENT)
Dept: URBAN - METROPOLITAN AREA CLINIC 113 | Facility: CLINIC | Age: 34
End: 2024-01-11

## 2024-01-11 VITALS
TEMPERATURE: 97.7 F | HEART RATE: 87 BPM | SYSTOLIC BLOOD PRESSURE: 122 MMHG | RESPIRATION RATE: 18 BRPM | WEIGHT: 153 LBS | BODY MASS INDEX: 24.59 KG/M2 | HEIGHT: 66 IN | DIASTOLIC BLOOD PRESSURE: 75 MMHG

## 2024-01-11 DIAGNOSIS — K50.80 CROHN'S COLITIS: ICD-10-CM

## 2024-01-11 PROCEDURE — 96413 CHEMO IV INFUSION 1 HR: CPT | Performed by: INTERNAL MEDICINE

## 2024-01-11 RX ORDER — PREDNISONE 20 MG/1
TAKE 2 TABLETS BY MOUTH EVERY DAY TABLET ORAL
Qty: 60 TABLET | Refills: 1 | Status: ON HOLD | COMMUNITY

## 2024-01-11 RX ORDER — VEDOLIZUMAB 300 MG/5ML
AS DIRECTED INJECTION, POWDER, LYOPHILIZED, FOR SOLUTION INTRAVENOUS
Qty: 1 | Refills: 8 | Status: ACTIVE | COMMUNITY

## 2024-01-11 RX ORDER — ERGOCALCIFEROL 1.25 MG/1
TAKE 1 CAPSULE BY MOUTH ONCE A WEEK FOR 90 DAYS CAPSULE, LIQUID FILLED ORAL
Qty: 12 CAPSULE | Refills: 1 | Status: ACTIVE | COMMUNITY

## 2024-01-11 RX ORDER — CLINDAMYCIN PHOSPHATE TOPICAL SOLUTION USP, 1% 10 MG/ML
APPLY TO AFFECTED AREA EXTERNALLY TWICE A DAY FOR 30 DAYS SOLUTION TOPICAL
Qty: 60 MILLILITER | Refills: 2 | Status: ACTIVE | COMMUNITY

## 2024-01-11 RX ORDER — TRIAMCINOLONE ACETONIDE 1 MG/G
1 APPLICATION CREAM TOPICAL
Status: ACTIVE | COMMUNITY

## 2024-01-24 ENCOUNTER — TELEPHONE ENCOUNTER (OUTPATIENT)
Dept: URBAN - METROPOLITAN AREA CLINIC 113 | Facility: CLINIC | Age: 34
End: 2024-01-24

## 2024-01-24 RX ORDER — RISANKIZUMAB-RZAA 360 MG/2.4
AT WEEK 12 WEARABLE INJECTOR SUBCUTANEOUS
Qty: 360 | Refills: 0 | OUTPATIENT
Start: 2024-01-26

## 2024-01-24 RX ORDER — RISANKIZUMAB-RZAA 60 MG/ML
AS DIRECTED INJECTION INTRAVENOUS
Qty: 600 | Refills: 0 | OUTPATIENT
Start: 2024-01-26

## 2024-02-14 ENCOUNTER — ENT (OUTPATIENT)
Dept: URBAN - METROPOLITAN AREA CLINIC 112 | Facility: CLINIC | Age: 34
End: 2024-02-14
Payer: COMMERCIAL

## 2024-02-14 VITALS
RESPIRATION RATE: 16 BRPM | SYSTOLIC BLOOD PRESSURE: 117 MMHG | HEART RATE: 80 BPM | TEMPERATURE: 97.5 F | WEIGHT: 151 LBS | DIASTOLIC BLOOD PRESSURE: 69 MMHG | HEIGHT: 66 IN | BODY MASS INDEX: 24.27 KG/M2

## 2024-02-14 DIAGNOSIS — K50.819 CROHN'S DISEASE OF BOTH SMALL AND LARGE INTESTINE WITH COMPLICATION: ICD-10-CM

## 2024-02-14 PROCEDURE — 96413 CHEMO IV INFUSION 1 HR: CPT | Performed by: STUDENT IN AN ORGANIZED HEALTH CARE EDUCATION/TRAINING PROGRAM

## 2024-02-14 RX ORDER — RISANKIZUMAB-RZAA 360 MG/2.4
AT WEEK 12 WEARABLE INJECTOR SUBCUTANEOUS
Qty: 360 | Refills: 0 | Status: ACTIVE | COMMUNITY
Start: 2024-01-26

## 2024-02-14 RX ORDER — PREDNISONE 20 MG/1
TAKE 2 TABLETS BY MOUTH EVERY DAY TABLET ORAL
Qty: 60 TABLET | Refills: 1 | Status: ON HOLD | COMMUNITY

## 2024-02-14 RX ORDER — ERGOCALCIFEROL 1.25 MG/1
TAKE 1 CAPSULE BY MOUTH ONCE A WEEK FOR 90 DAYS CAPSULE, LIQUID FILLED ORAL
Qty: 12 CAPSULE | Refills: 1 | Status: ACTIVE | COMMUNITY

## 2024-02-14 RX ORDER — VEDOLIZUMAB 300 MG/5ML
AS DIRECTED INJECTION, POWDER, LYOPHILIZED, FOR SOLUTION INTRAVENOUS
Qty: 1 | Refills: 8 | Status: ACTIVE | COMMUNITY

## 2024-02-14 RX ORDER — RISANKIZUMAB-RZAA 60 MG/ML
AS DIRECTED INJECTION INTRAVENOUS
Qty: 600 | Refills: 0 | Status: ACTIVE | COMMUNITY
Start: 2024-01-26

## 2024-02-14 RX ORDER — CLINDAMYCIN PHOSPHATE TOPICAL SOLUTION USP, 1% 10 MG/ML
APPLY TO AFFECTED AREA EXTERNALLY TWICE A DAY FOR 30 DAYS SOLUTION TOPICAL
Qty: 60 MILLILITER | Refills: 2 | Status: ACTIVE | COMMUNITY

## 2024-02-14 RX ORDER — TRIAMCINOLONE ACETONIDE 1 MG/G
1 APPLICATION CREAM TOPICAL
Status: ACTIVE | COMMUNITY

## 2024-03-13 ENCOUNTER — OV EP (OUTPATIENT)
Dept: URBAN - METROPOLITAN AREA CLINIC 113 | Facility: CLINIC | Age: 34
End: 2024-03-13
Payer: COMMERCIAL

## 2024-03-13 VITALS
SYSTOLIC BLOOD PRESSURE: 144 MMHG | DIASTOLIC BLOOD PRESSURE: 92 MMHG | HEART RATE: 111 BPM | TEMPERATURE: 97.5 F | WEIGHT: 142 LBS | HEIGHT: 66 IN | RESPIRATION RATE: 16 BRPM | BODY MASS INDEX: 22.82 KG/M2

## 2024-03-13 DIAGNOSIS — Z79.899 HIGH RISK MEDICATIONS (NOT ANTICOAGULANTS) LONG-TERM USE: ICD-10-CM

## 2024-03-13 DIAGNOSIS — K50.819 CROHN'S DISEASE OF SMALL AND LARGE INTESTINES WITH COMPLICATION: ICD-10-CM

## 2024-03-13 DIAGNOSIS — R10.13 DYSPEPSIA: ICD-10-CM

## 2024-03-13 DIAGNOSIS — R11.0 NAUSEA: ICD-10-CM

## 2024-03-13 PROBLEM — 162031009: Status: ACTIVE | Noted: 2024-03-13

## 2024-03-13 PROCEDURE — 99214 OFFICE O/P EST MOD 30 MIN: CPT | Performed by: INTERNAL MEDICINE

## 2024-03-13 RX ORDER — RISANKIZUMAB-RZAA 60 MG/ML
AS DIRECTED INJECTION INTRAVENOUS
Qty: 600 | Refills: 0 | Status: ACTIVE | COMMUNITY
Start: 2024-01-26

## 2024-03-13 RX ORDER — PANTOPRAZOLE SODIUM 40 MG/1
1 TABLET 30 MINUTES BEFORE A MEAL TABLET, DELAYED RELEASE ORAL ONCE A DAY
Qty: 30 | Refills: 5 | OUTPATIENT
Start: 2024-03-13

## 2024-03-13 RX ORDER — ONDANSETRON 8 MG/1
1 TABLET ON THE TONGUE AND ALLOW TO DISSOLVE AS NEEDED TABLET, ORALLY DISINTEGRATING ORAL EVERY 8 HOURS
Qty: 60 TABLET | Refills: 3 | OUTPATIENT
Start: 2024-03-13

## 2024-03-13 RX ORDER — RISANKIZUMAB-RZAA 360 MG/2.4
AT WEEK 12 WEARABLE INJECTOR SUBCUTANEOUS
Qty: 360 | Refills: 0 | Status: ACTIVE | COMMUNITY
Start: 2024-01-26

## 2024-03-13 RX ORDER — PREDNISONE 20 MG/1
TAKE 2 TABLETS BY MOUTH EVERY DAY TABLET ORAL
Qty: 60 TABLET | Refills: 1 | Status: ON HOLD | COMMUNITY

## 2024-03-13 RX ORDER — RISANKIZUMAB-RZAA 60 MG/ML
AS DIRECTED INJECTION INTRAVENOUS
OUTPATIENT
Start: 2024-01-26

## 2024-03-13 RX ORDER — VEDOLIZUMAB 300 MG/5ML
AS DIRECTED INJECTION, POWDER, LYOPHILIZED, FOR SOLUTION INTRAVENOUS
Qty: 1 | Refills: 8 | Status: ACTIVE | COMMUNITY

## 2024-03-13 RX ORDER — TRIAMCINOLONE ACETONIDE 1 MG/G
1 APPLICATION CREAM TOPICAL
Status: ACTIVE | COMMUNITY

## 2024-03-13 RX ORDER — ERGOCALCIFEROL 1.25 MG/1
TAKE 1 CAPSULE BY MOUTH ONCE A WEEK FOR 90 DAYS CAPSULE, LIQUID FILLED ORAL
Qty: 12 CAPSULE | Refills: 1 | Status: ACTIVE | COMMUNITY

## 2024-03-13 RX ORDER — CLINDAMYCIN PHOSPHATE TOPICAL SOLUTION USP, 1% 10 MG/ML
APPLY TO AFFECTED AREA EXTERNALLY TWICE A DAY FOR 30 DAYS SOLUTION TOPICAL
Qty: 60 MILLILITER | Refills: 2 | Status: ACTIVE | COMMUNITY

## 2024-03-13 NOTE — HPI-TODAY'S VISIT:
Ms. Elizabeth is a 33-year-old woman with a history of ileocolonic Crohn's disease status post ileocolonic resection and diverting loop colostomy s/p takedown (12/21) presenting for follow up regarding flair of Crohn's.  She last received her Entyvio infusion on 2/14/2024.  She reports that after the Entyvio infusion she typically improves for 1 to 2 weeks before having worsening abdominal symptoms and joint pain.  She feels that her symptoms have been much worse over the past month after she had a delay in her 1/3/24 infusion until 1/11/2024.  She went to a urgent care secondary to worsening joint pain that was treated with Toradol.  She continues to have multifocal joint pain bilaterally.    Her bowel movements are loose consistency, several times per day, no episodes of fecal incontinence.  She has seen some mucus and blood in her stool. She no longer wishes to receive Entyvio she does not feel is been helpful.  Previously ordered Entyvio level and antibody were not completed.  She has nausea without vomiting, and epigastric burning.  No NSAID use. No difficulty swallowing.

## 2024-03-14 LAB
A/G RATIO: 1.1
ABSOLUTE BASOPHILS: 33
ABSOLUTE EOSINOPHILS: 66
ABSOLUTE LYMPHOCYTES: 1304
ABSOLUTE MONOCYTES: 730
ABSOLUTE NEUTROPHILS: 6068
ALBUMIN: 4.2
ALKALINE PHOSPHATASE: 92
ALT (SGPT): 7
AST (SGOT): 11
BASOPHILS: 0.4
BILIRUBIN, TOTAL: 0.3
BUN/CREATININE RATIO: (no result)
BUN: 12
C-REACTIVE PROTEIN, QUANT: 78.4
CALCIUM: 9.9
CARBON DIOXIDE, TOTAL: 27
CHLORIDE: 101
CREATININE: 0.73
EGFR: 113
EOSINOPHILS: 0.8
GLOBULIN, TOTAL: 3.7
GLUCOSE: 79
HEMATOCRIT: 38.4
HEMOGLOBIN: 12.5
LYMPHOCYTES: 15.9
MCH: 31.3
MCHC: 32.6
MCV: 96
MONOCYTES: 8.9
MPV: 9.9
NEUTROPHILS: 74
PLATELET COUNT: 322
POTASSIUM: 3.9
PROTEIN, TOTAL: 7.9
RDW: 12.1
RED BLOOD CELL COUNT: 4
SODIUM: 139
WHITE BLOOD CELL COUNT: 8.2

## 2024-03-18 ENCOUNTER — OV EP (OUTPATIENT)
Dept: URBAN - METROPOLITAN AREA CLINIC 113 | Facility: CLINIC | Age: 34
End: 2024-03-18

## 2024-03-27 ENCOUNTER — SKY (OUTPATIENT)
Dept: URBAN - METROPOLITAN AREA CLINIC 112 | Facility: CLINIC | Age: 34
End: 2024-03-27

## 2024-05-13 ENCOUNTER — TELEPHONE ENCOUNTER (OUTPATIENT)
Dept: URBAN - METROPOLITAN AREA CLINIC 113 | Facility: CLINIC | Age: 34
End: 2024-05-13

## 2024-05-20 ENCOUNTER — TELEPHONE ENCOUNTER (OUTPATIENT)
Dept: URBAN - METROPOLITAN AREA CLINIC 113 | Facility: CLINIC | Age: 34
End: 2024-05-20

## 2024-05-29 ENCOUNTER — TELEPHONE ENCOUNTER (OUTPATIENT)
Dept: URBAN - METROPOLITAN AREA CLINIC 113 | Facility: CLINIC | Age: 34
End: 2024-05-29

## 2024-05-29 RX ORDER — RISANKIZUMAB-RZAA 360 MG/2.4
AT WEEK 12 WEARABLE INJECTOR SUBCUTANEOUS
Refills: 0
Start: 2024-01-26 | End: 2024-06-30

## 2024-06-03 ENCOUNTER — OFFICE VISIT (OUTPATIENT)
Dept: URBAN - METROPOLITAN AREA CLINIC 113 | Facility: CLINIC | Age: 34
End: 2024-06-03
Payer: COMMERCIAL

## 2024-06-03 ENCOUNTER — DASHBOARD ENCOUNTERS (OUTPATIENT)
Age: 34
End: 2024-06-03

## 2024-06-03 VITALS
TEMPERATURE: 97.6 F | RESPIRATION RATE: 20 BRPM | HEART RATE: 67 BPM | DIASTOLIC BLOOD PRESSURE: 67 MMHG | HEIGHT: 66 IN | SYSTOLIC BLOOD PRESSURE: 109 MMHG | BODY MASS INDEX: 24.04 KG/M2 | WEIGHT: 149.6 LBS

## 2024-06-03 DIAGNOSIS — E55.9 VITAMIN D DEFICIENCY: ICD-10-CM

## 2024-06-03 DIAGNOSIS — Z79.899 HIGH RISK MEDICATIONS (NOT ANTICOAGULANTS) LONG-TERM USE: ICD-10-CM

## 2024-06-03 DIAGNOSIS — K50.819 CROHN'S DISEASE OF SMALL AND LARGE INTESTINES WITH COMPLICATION: ICD-10-CM

## 2024-06-03 PROCEDURE — 99214 OFFICE O/P EST MOD 30 MIN: CPT | Performed by: INTERNAL MEDICINE

## 2024-06-03 RX ORDER — CLINDAMYCIN PHOSPHATE TOPICAL SOLUTION USP, 1% 10 MG/ML
APPLY TO AFFECTED AREA EXTERNALLY TWICE A DAY FOR 30 DAYS SOLUTION TOPICAL
Qty: 60 MILLILITER | Refills: 2 | Status: ACTIVE | COMMUNITY

## 2024-06-03 RX ORDER — ONDANSETRON 8 MG/1
1 TABLET ON THE TONGUE AND ALLOW TO DISSOLVE AS NEEDED TABLET, ORALLY DISINTEGRATING ORAL EVERY 8 HOURS
Qty: 60 TABLET | Refills: 3 | Status: ON HOLD | COMMUNITY
Start: 2024-03-13

## 2024-06-03 RX ORDER — RISANKIZUMAB-RZAA 360 MG/2.4
AT WEEK 12 WEARABLE INJECTOR SUBCUTANEOUS
OUTPATIENT
Start: 2024-01-26

## 2024-06-03 RX ORDER — RISANKIZUMAB-RZAA 60 MG/ML
AS DIRECTED INJECTION INTRAVENOUS
Status: ON HOLD | COMMUNITY
Start: 2024-01-26

## 2024-06-03 RX ORDER — PREDNISONE 20 MG/1
TAKE 2 TABLETS BY MOUTH EVERY DAY TABLET ORAL
Qty: 60 TABLET | Refills: 1 | Status: ON HOLD | COMMUNITY

## 2024-06-03 RX ORDER — VEDOLIZUMAB 300 MG/5ML
AS DIRECTED INJECTION, POWDER, LYOPHILIZED, FOR SOLUTION INTRAVENOUS
Qty: 1 | Refills: 8 | Status: ON HOLD | COMMUNITY

## 2024-06-03 RX ORDER — RISANKIZUMAB-RZAA 360 MG/2.4
AT WEEK 12 WEARABLE INJECTOR SUBCUTANEOUS
Refills: 0 | Status: ACTIVE | COMMUNITY
Start: 2024-01-26 | End: 2024-06-30

## 2024-06-03 RX ORDER — PANTOPRAZOLE SODIUM 40 MG/1
1 TABLET 30 MINUTES BEFORE A MEAL TABLET, DELAYED RELEASE ORAL ONCE A DAY
Qty: 30 | Refills: 5 | Status: ON HOLD | COMMUNITY
Start: 2024-03-13

## 2024-06-03 RX ORDER — TRIAMCINOLONE ACETONIDE 1 MG/G
1 APPLICATION CREAM TOPICAL
Status: ACTIVE | COMMUNITY

## 2024-06-03 RX ORDER — ERGOCALCIFEROL 1.25 MG/1
TAKE 1 CAPSULE BY MOUTH ONCE A WEEK FOR 90 DAYS CAPSULE, LIQUID FILLED ORAL
Qty: 12 CAPSULE | Refills: 1 | Status: ON HOLD | COMMUNITY

## 2024-06-03 NOTE — HPI-TODAY'S VISIT:
Ms. Elizabeth is a 33-year-old woman with a history of ileocolonic Crohn's disease status post ileocolonic resection and diverting loop colostomy s/p takedown (12/21) presenting for follow up after Skyrizi induction infusions.  She was last seen in the office on 3/13/2024 for follow-up regarding Crohn's disease on Entyvio 300 mg every 8 weeks with worsening colitis symptoms and joint pain.  She clinically was felt to have experienced a loss of response to Entyvio.  She was transition from Entyvio to Skyrizi, and symptoms of nausea treated with ondansetron and pantoprazole 40 mg daily.  Labs on 3/13/2024 showed normal CBC, normal basic metabolic panel, normal liver function tests, CRP 78.4.  She presents for follow-up after completing her third induction infusion with pending maintenance injection of Skyrizi 360 mg every 8 weeks.  She reports that she is feeling better.  She states that this is "the best I felt".  She has not had any issues of joint pain.  She reports that her bowel movements are "fine" except for during her menstrual cycle.  She denies any mucus or red blood per rectum.  No abdominal pain.  No nausea, vomiting, heartburn or difficulty swallowing

## 2024-06-04 ENCOUNTER — TELEPHONE ENCOUNTER (OUTPATIENT)
Dept: URBAN - METROPOLITAN AREA CLINIC 113 | Facility: CLINIC | Age: 34
End: 2024-06-04

## 2024-06-20 ENCOUNTER — TELEPHONE ENCOUNTER (OUTPATIENT)
Dept: URBAN - METROPOLITAN AREA CLINIC 113 | Facility: CLINIC | Age: 34
End: 2024-06-20

## 2024-06-21 LAB
A/G RATIO: 1.4
ABSOLUTE BASOPHILS: 22
ABSOLUTE EOSINOPHILS: 81
ABSOLUTE LYMPHOCYTES: 1776
ABSOLUTE MONOCYTES: 540
ABSOLUTE NEUTROPHILS: 4980
ALBUMIN: 4.1
ALKALINE PHOSPHATASE: 58
ALT (SGPT): 18
AST (SGOT): 55
BASOPHILS: 0.3
BILIRUBIN, TOTAL: 0.3
BUN/CREATININE RATIO: (no result)
BUN: 10
CALCIUM: 8.9
CARBON DIOXIDE, TOTAL: 23
CHLORIDE: 104
CREATININE: 0.74
EGFR: 111
EOSINOPHILS: 1.1
GLOBULIN, TOTAL: 3
GLUCOSE: 105
HEMATOCRIT: 34.8
HEMOGLOBIN: 11.2
LYMPHOCYTES: 24
MCH: 31.3
MCHC: 32.2
MCV: 97.2
MONOCYTES: 7.3
MPV: 10.4
NEUTROPHILS: 67.3
PLATELET COUNT: 238
POTASSIUM: 3.6
PROTEIN, TOTAL: 7.1
RDW: 13.9
RED BLOOD CELL COUNT: 3.58
SODIUM: 138
VITAMIN D,25-OH,TOTAL,IA: 36
WHITE BLOOD CELL COUNT: 7.4

## 2024-06-26 ENCOUNTER — TELEPHONE ENCOUNTER (OUTPATIENT)
Dept: URBAN - METROPOLITAN AREA CLINIC 113 | Facility: CLINIC | Age: 34
End: 2024-06-26

## 2024-06-27 ENCOUNTER — TELEPHONE ENCOUNTER (OUTPATIENT)
Dept: URBAN - METROPOLITAN AREA CLINIC 113 | Facility: CLINIC | Age: 34
End: 2024-06-27

## 2024-06-28 ENCOUNTER — TELEPHONE ENCOUNTER (OUTPATIENT)
Dept: URBAN - METROPOLITAN AREA CLINIC 113 | Facility: CLINIC | Age: 34
End: 2024-06-28

## 2024-06-28 RX ORDER — RISANKIZUMAB-RZAA 360 MG/2.4
AT WEEK 12 WEARABLE INJECTOR SUBCUTANEOUS
Refills: 0
Start: 2024-01-26 | End: 2024-07-28

## 2024-08-13 ENCOUNTER — TELEPHONE ENCOUNTER (OUTPATIENT)
Dept: URBAN - METROPOLITAN AREA CLINIC 113 | Facility: CLINIC | Age: 34
End: 2024-08-13

## 2024-09-05 ENCOUNTER — TELEPHONE ENCOUNTER (OUTPATIENT)
Dept: URBAN - METROPOLITAN AREA CLINIC 113 | Facility: CLINIC | Age: 34
End: 2024-09-05

## 2024-10-18 ENCOUNTER — TELEPHONE ENCOUNTER (OUTPATIENT)
Dept: URBAN - METROPOLITAN AREA CLINIC 113 | Facility: CLINIC | Age: 34
End: 2024-10-18

## 2024-11-01 ENCOUNTER — APPOINTMENT (RX ONLY)
Dept: URBAN - METROPOLITAN AREA CLINIC 33 | Facility: CLINIC | Age: 34
Setting detail: DERMATOLOGY
End: 2024-11-01

## 2024-11-01 DIAGNOSIS — L73.2 HIDRADENITIS SUPPURATIVA: ICD-10-CM | Status: INADEQUATELY CONTROLLED

## 2024-11-01 PROCEDURE — ? SEPARATE AND IDENTIFIABLE DOCUMENTATION

## 2024-11-01 PROCEDURE — ? PRESCRIPTION MEDICATION MANAGEMENT

## 2024-11-01 PROCEDURE — 11900 INJECT SKIN LESIONS </W 7: CPT

## 2024-11-01 PROCEDURE — ? REFERRAL CORRESPONDENCE

## 2024-11-01 PROCEDURE — ? PRESCRIPTION

## 2024-11-01 PROCEDURE — ? INTRALESIONAL KENALOG

## 2024-11-01 PROCEDURE — 99204 OFFICE O/P NEW MOD 45 MIN: CPT | Mod: 25

## 2024-11-01 PROCEDURE — ? COUNSELING

## 2024-11-01 RX ORDER — FLUCONAZOLE 150 MG/1
TABLET ORAL
Qty: 3 | Refills: 0 | Status: ERX | COMMUNITY
Start: 2024-11-01

## 2024-11-01 RX ORDER — AMOXICILLIN 500 MG/1
CAPSULE ORAL BID
Qty: 60 | Refills: 0 | Status: ERX | COMMUNITY
Start: 2024-11-01

## 2024-11-01 RX ADMIN — FLUCONAZOLE: 150 TABLET ORAL at 00:00

## 2024-11-01 RX ADMIN — AMOXICILLIN: 500 CAPSULE ORAL at 00:00

## 2024-11-01 ASSESSMENT — LOCATION ZONE DERM: LOCATION ZONE: AXILLAE

## 2024-11-01 ASSESSMENT — LOCATION DETAILED DESCRIPTION DERM: LOCATION DETAILED: RIGHT AXILLARY VAULT

## 2024-11-01 ASSESSMENT — HURLEY STAGE
IN YOUR EXPERIENCE, AMONG ALL PATIENTS YOU HAVE SEEN WITH THIS CONDITION, HOW SEVERE IS THIS PATIENT'S CONDITION?: HURLEY STAGE II: SINGLE OR MULTIPLE, WIDELY SEPARATED RECURRENT ABSCESSES WITH SINUS TRACT FORMATION AND SCARRING

## 2024-11-01 ASSESSMENT — LOCATION SIMPLE DESCRIPTION DERM: LOCATION SIMPLE: RIGHT AXILLARY VAULT

## 2024-11-01 NOTE — PROCEDURE: PRESCRIPTION MEDICATION MANAGEMENT
Render In Strict Bullet Format?: No
Plan: Crohn's currently managed with Skyrizi. Patient reports history of recurrent flare involving R axilla and interested in surgical options. Referral generated to general surgery.
Detail Level: Zone
Initiate Treatment: Amoxicillin 500mg BID\\nfluconazole 150mg PRN for yeast infection (patient requests Rx)

## 2024-11-01 NOTE — HPI: SKIN LESION
How Severe Is Your Skin Lesion?: moderate
Has Your Skin Lesion Been Treated?: not been treated
Is This A New Presentation, Or A Follow-Up?: Skin Lesion
Additional History: Patient c/o HS flare. She is currently taking Skyrizi for crohn's. Has been treated with multiple oral antibiotics in the past, topical clindamycin and I&D.

## 2024-11-01 NOTE — PROCEDURE: INTRALESIONAL KENALOG
Require Ndc Code?: No
Show Inventory Tab: Hide
Expiration Date For Kenalog (Optional): March 2026
How Many Mls Were Removed From The 40 Mg/Ml (1ml) Vial When Preparing The Injectable Solution?: 0
Ndc# For Dany Only: [address]-63
Concentration Of Kenalog Solution Injected (Mg/Ml): 10.0
Total Volume (Ccs): .5
Validate Note Data When Using Inventory: Yes
Detail Level: Detailed
Kenalog Preparation: Kenalog
Consent: The risks of atrophy were reviewed with the patient.
Lot # For Kenalog (Optional): [id number]
Kenalog Type Of Vial: Multiple Dose
Administered By (Optional): Tucker Ortega PA-C
Medical Necessity Clause: This procedure was medically necessary because the lesions that were treated were:

## 2024-11-20 RX ORDER — FLUCONAZOLE 150 MG/1
TABLET ORAL
Qty: 2 | Refills: 0 | Status: ERX

## 2024-12-16 ENCOUNTER — OFFICE VISIT (OUTPATIENT)
Dept: URBAN - METROPOLITAN AREA CLINIC 113 | Facility: CLINIC | Age: 34
End: 2024-12-16

## 2024-12-16 VITALS
HEIGHT: 66 IN | RESPIRATION RATE: 18 BRPM | WEIGHT: 170 LBS | TEMPERATURE: 98.2 F | HEART RATE: 65 BPM | DIASTOLIC BLOOD PRESSURE: 78 MMHG | BODY MASS INDEX: 27.32 KG/M2 | SYSTOLIC BLOOD PRESSURE: 129 MMHG

## 2024-12-16 RX ORDER — CHOLECALCIFEROL (VITAMIN D3) 50 MCG
1 CAPSULE CAPSULE ORAL ONCE A DAY
Status: ACTIVE | COMMUNITY

## 2024-12-16 RX ORDER — VEDOLIZUMAB 300 MG/5ML
AS DIRECTED INJECTION, POWDER, LYOPHILIZED, FOR SOLUTION INTRAVENOUS
Qty: 1 | Refills: 8 | Status: DISCONTINUED | COMMUNITY

## 2024-12-16 RX ORDER — UBIDECARENONE 30 MG
AS DIRECTED CAPSULE ORAL
Status: ACTIVE | COMMUNITY

## 2024-12-16 RX ORDER — RISANKIZUMAB-RZAA 60 MG/ML
AS DIRECTED INJECTION INTRAVENOUS
Status: DISCONTINUED | COMMUNITY
Start: 2024-01-26

## 2024-12-16 RX ORDER — CLINDAMYCIN PHOSPHATE TOPICAL SOLUTION USP, 1% 10 MG/ML
APPLY TO AFFECTED AREA EXTERNALLY TWICE A DAY FOR 30 DAYS SOLUTION TOPICAL
Qty: 60 MILLILITER | Refills: 2 | Status: ACTIVE | COMMUNITY

## 2024-12-16 RX ORDER — PREDNISONE 20 MG/1
TAKE 2 TABLETS BY MOUTH EVERY DAY TABLET ORAL
Qty: 60 TABLET | Refills: 1 | Status: DISCONTINUED | COMMUNITY

## 2024-12-16 RX ORDER — TRIAMCINOLONE ACETONIDE 1 MG/G
1 APPLICATION CREAM TOPICAL
Status: ACTIVE | COMMUNITY

## 2024-12-16 RX ORDER — RISANKIZUMAB-RZAA 360 MG/2.4
AS DIRECTED WEARABLE INJECTOR SUBCUTANEOUS
Status: ACTIVE | COMMUNITY

## 2024-12-16 RX ORDER — PANTOPRAZOLE SODIUM 40 MG/1
1 TABLET 30 MINUTES BEFORE A MEAL TABLET, DELAYED RELEASE ORAL ONCE A DAY
Qty: 30 | Refills: 5 | Status: DISCONTINUED | COMMUNITY
Start: 2024-03-13

## 2024-12-16 RX ORDER — ONDANSETRON 8 MG/1
1 TABLET ON THE TONGUE AND ALLOW TO DISSOLVE AS NEEDED TABLET, ORALLY DISINTEGRATING ORAL EVERY 8 HOURS
Qty: 60 TABLET | Refills: 3 | Status: ACTIVE | COMMUNITY
Start: 2024-03-13

## 2024-12-16 RX ORDER — ERGOCALCIFEROL 1.25 MG/1
TAKE 1 CAPSULE BY MOUTH ONCE A WEEK FOR 90 DAYS CAPSULE, LIQUID FILLED ORAL
Qty: 12 CAPSULE | Refills: 1 | Status: DISCONTINUED | COMMUNITY

## 2024-12-16 NOTE — HPI-TODAY'S VISIT:
Ms. Elizabeth is a 34-year-old woman with a history of ileocolonic Crohn's disease status post ileocolonic resection and diverting loop colostomy s/p takedown (12/21) presenting for follow up.

## 2025-01-07 ENCOUNTER — TELEPHONE ENCOUNTER (OUTPATIENT)
Dept: URBAN - METROPOLITAN AREA CLINIC 113 | Facility: CLINIC | Age: 35
End: 2025-01-07

## 2025-01-07 RX ORDER — RISANKIZUMAB-RZAA 360 MG/2.4
INJECT ONE 2.4ML CARTRIDGE WEARABLE INJECTOR SUBCUTANEOUS
Qty: 1 | Refills: 8

## 2025-03-06 ENCOUNTER — TELEPHONE ENCOUNTER (OUTPATIENT)
Dept: URBAN - METROPOLITAN AREA CLINIC 113 | Facility: CLINIC | Age: 35
End: 2025-03-06

## 2025-03-06 RX ORDER — RISANKIZUMAB-RZAA 360 MG/2.4
INJECT ONE 2.4ML CARTRIDGE WEARABLE INJECTOR SUBCUTANEOUS
Qty: 1 | Refills: 7

## 2025-05-29 ENCOUNTER — TELEPHONE ENCOUNTER (OUTPATIENT)
Dept: URBAN - METROPOLITAN AREA CLINIC 113 | Facility: CLINIC | Age: 35
End: 2025-05-29

## 2025-06-10 ENCOUNTER — ERX REFILL RESPONSE (OUTPATIENT)
Dept: URBAN - METROPOLITAN AREA CLINIC 113 | Facility: CLINIC | Age: 35
End: 2025-06-10

## 2025-06-10 RX ORDER — RISANKIZUMAB-RZAA 360 MG/2.4
INJECT THE CONTENTS OF 1 CARTRIDGE (360 MG) UNDER THE SKIN EVERY 8 WEEKS KIT SUBCUTANEOUS
Qty: 2.4 MILLILITER | Refills: 5

## 2025-07-14 ENCOUNTER — OFFICE VISIT (OUTPATIENT)
Dept: URBAN - METROPOLITAN AREA CLINIC 113 | Facility: CLINIC | Age: 35
End: 2025-07-14
Payer: COMMERCIAL

## 2025-07-14 DIAGNOSIS — Z79.899 HIGH RISK MEDICATION USE: ICD-10-CM

## 2025-07-14 DIAGNOSIS — K50.819 CROHN'S DISEASE OF SMALL AND LARGE INTESTINES WITH COMPLICATION: ICD-10-CM

## 2025-07-14 DIAGNOSIS — E55.9 VITAMIN D DEFICIENCY: ICD-10-CM

## 2025-07-14 PROCEDURE — 99213 OFFICE O/P EST LOW 20 MIN: CPT | Performed by: INTERNAL MEDICINE

## 2025-07-14 RX ORDER — CHOLECALCIFEROL (VITAMIN D3) 50 MCG
1 CAPSULE CAPSULE ORAL ONCE A DAY
Status: ACTIVE | COMMUNITY

## 2025-07-14 RX ORDER — RISANKIZUMAB-RZAA 360 MG/2.4
INJECT THE CONTENTS OF 1 CARTRIDGE (360 MG) UNDER THE SKIN EVERY 8 WEEKS KIT SUBCUTANEOUS
OUTPATIENT

## 2025-07-14 RX ORDER — CLINDAMYCIN PHOSPHATE TOPICAL SOLUTION USP, 1% 10 MG/ML
APPLY TO AFFECTED AREA EXTERNALLY TWICE A DAY FOR 30 DAYS SOLUTION TOPICAL
Qty: 60 MILLILITER | Refills: 2 | Status: ACTIVE | COMMUNITY

## 2025-07-14 RX ORDER — SPIRONOLACTONE 50 MG/1
1 TABLET TABLET, FILM COATED ORAL ONCE A DAY
Status: ACTIVE | COMMUNITY

## 2025-07-14 RX ORDER — CHOLECALCIFEROL (VITAMIN D3) 50 MCG
1 CAPSULE CAPSULE ORAL ONCE A DAY
OUTPATIENT

## 2025-07-14 RX ORDER — TRIAMCINOLONE ACETONIDE 1 MG/G
1 APPLICATION CREAM TOPICAL
Status: ACTIVE | COMMUNITY

## 2025-07-14 RX ORDER — ONDANSETRON 8 MG/1
1 TABLET ON THE TONGUE AND ALLOW TO DISSOLVE AS NEEDED TABLET, ORALLY DISINTEGRATING ORAL EVERY 8 HOURS
Qty: 60 TABLET | Refills: 3 | Status: ACTIVE | COMMUNITY
Start: 2024-03-13

## 2025-07-14 RX ORDER — VILOXAZINE HYDROCHLORIDE 200 MG/1
1 CAPSULE CAPSULE, EXTENDED RELEASE ORAL ONCE A DAY
Status: ACTIVE | COMMUNITY

## 2025-07-14 RX ORDER — RISANKIZUMAB-RZAA 360 MG/2.4
INJECT THE CONTENTS OF 1 CARTRIDGE (360 MG) UNDER THE SKIN EVERY 8 WEEKS KIT SUBCUTANEOUS
Qty: 2.4 MILLILITER | Refills: 5 | Status: ACTIVE | COMMUNITY

## 2025-07-14 RX ORDER — UBIDECARENONE 30 MG
AS DIRECTED CAPSULE ORAL
Status: ACTIVE | COMMUNITY

## 2025-07-14 NOTE — HPI-TODAY'S VISIT:
Ms. Elizabeth is a 34-year-old woman with a history of ileocolonic Crohn's disease status post ileocolonic resection and diverting loop colostomy s/p takedown (12/21) presenting for follow up regarding Crohn's disease on Skyrizi 360 mg every 8 weeks.  She was last seen in the office on 12/16/2024 for follow-up regarding her small bowel and colonic Crohn's disease on Skyrizi 360 mg every 8 weeks and vitamin D deficiency.  At that time she was in clinical remission.  She was due for routine labs including testing for vitamin D and tuberculosis.  Her bowel movements are occasionally loose around the time of her menstrual cycle otherwise mostly regular, fairly formed, no mucus or blood per rectum.  She also denies any abdominal pain.  She continues with the Skyrizi on body injections without any issues.  She has occasional flares of her hidradenitis including a perianal abscess in March that has resolved.  She currently is on spironolactone daily.  She denies any rashes or joint aches.  She had labs on 5/29/2025 showed glucose 86, BUN 12, creatinine 0.78, sodium 141, potassium 4.7, chloride 105, CO2 24, calcium 9.5, total protein 7.4, albumin 4.4; total bilirubin 0.4, alkaline phosphatase 66, AST 21, ALT 19, vitamin D 79, vitamin B12 536, iron 103, TIBC 439, percent iron 23 TSH 0.57; WBC 6, hemoglobin 12.9, MCV 99, platelets 191.

## 2025-08-08 ENCOUNTER — TELEPHONE ENCOUNTER (OUTPATIENT)
Dept: URBAN - METROPOLITAN AREA CLINIC 113 | Facility: CLINIC | Age: 35
End: 2025-08-08